# Patient Record
Sex: FEMALE | Race: WHITE | NOT HISPANIC OR LATINO | Employment: OTHER | URBAN - METROPOLITAN AREA
[De-identification: names, ages, dates, MRNs, and addresses within clinical notes are randomized per-mention and may not be internally consistent; named-entity substitution may affect disease eponyms.]

---

## 2019-05-12 ENCOUNTER — APPOINTMENT (OUTPATIENT)
Dept: GENERAL RADIOLOGY | Facility: HOSPITAL | Age: 59
End: 2019-05-12

## 2019-05-12 ENCOUNTER — HOSPITAL ENCOUNTER (INPATIENT)
Facility: HOSPITAL | Age: 59
LOS: 4 days | Discharge: HOME | End: 2019-05-16
Attending: EMERGENCY MEDICINE | Admitting: INTERNAL MEDICINE

## 2019-05-12 DIAGNOSIS — J44.1 COPD WITH ACUTE EXACERBATION (HCC): ICD-10-CM

## 2019-05-12 DIAGNOSIS — B34.8 RHINOVIRUS: ICD-10-CM

## 2019-05-12 DIAGNOSIS — J96.02 ACUTE RESPIRATORY FAILURE WITH HYPOXIA AND HYPERCAPNIA (HCC): ICD-10-CM

## 2019-05-12 DIAGNOSIS — R09.02 HYPOXIA: ICD-10-CM

## 2019-05-12 DIAGNOSIS — J96.22 ACUTE ON CHRONIC RESPIRATORY FAILURE WITH HYPOXIA AND HYPERCAPNIA (HCC): ICD-10-CM

## 2019-05-12 DIAGNOSIS — E87.1 HYPONATREMIA: ICD-10-CM

## 2019-05-12 DIAGNOSIS — J44.9 CHRONIC OBSTRUCTIVE PULMONARY DISEASE (COPD) (HCC): ICD-10-CM

## 2019-05-12 DIAGNOSIS — E11.65 UNCONTROLLED TYPE 2 DIABETES MELLITUS WITH HYPERGLYCEMIA (HCC): ICD-10-CM

## 2019-05-12 DIAGNOSIS — J96.01 ACUTE RESPIRATORY FAILURE WITH HYPOXIA AND HYPERCAPNIA (HCC): ICD-10-CM

## 2019-05-12 DIAGNOSIS — I50.9 ACUTE CONGESTIVE HEART FAILURE, UNSPECIFIED HEART FAILURE TYPE (HCC): Primary | ICD-10-CM

## 2019-05-12 DIAGNOSIS — J96.20 ACUTE AND CHRONIC RESPIRATORY FAILURE (ACUTE-ON-CHRONIC) (HCC): ICD-10-CM

## 2019-05-12 DIAGNOSIS — J96.21 ACUTE ON CHRONIC RESPIRATORY FAILURE WITH HYPOXIA AND HYPERCAPNIA (HCC): ICD-10-CM

## 2019-05-12 PROBLEM — Z91.199 NONCOMPLIANCE: Status: ACTIVE | Noted: 2019-05-12

## 2019-05-12 PROBLEM — R00.0 TACHYCARDIA: Status: ACTIVE | Noted: 2019-05-12

## 2019-05-12 LAB
ALBUMIN SERPL-MCNC: 3.3 G/DL (ref 3.5–5.2)
ALBUMIN/GLOB SERPL: 0.7 G/DL
ALP SERPL-CCNC: 111 U/L (ref 39–117)
ALT SERPL W P-5'-P-CCNC: 16 U/L (ref 1–33)
ANION GAP SERPL CALCULATED.3IONS-SCNC: 11.1 MMOL/L
ARTERIAL PATENCY WRIST A: POSITIVE
AST SERPL-CCNC: 13 U/L (ref 1–32)
ATMOSPHERIC PRESS: 746.7 MMHG
B PARAPERT DNA SPEC QL NAA+PROBE: NOT DETECTED
B PERT DNA SPEC QL NAA+PROBE: NOT DETECTED
BASE EXCESS BLDA CALC-SCNC: 6.5 MMOL/L (ref 0–2)
BASOPHILS # BLD AUTO: 0.04 10*3/MM3 (ref 0–0.2)
BASOPHILS NFR BLD AUTO: 0.3 % (ref 0–1.5)
BDY SITE: ABNORMAL
BILIRUB SERPL-MCNC: 0.4 MG/DL (ref 0.2–1.2)
BUN BLD-MCNC: 16 MG/DL (ref 8–23)
BUN/CREAT SERPL: 28.6 (ref 7–25)
C PNEUM DNA NPH QL NAA+NON-PROBE: NOT DETECTED
CALCIUM SPEC-SCNC: 9.4 MG/DL (ref 8.6–10.5)
CHLORIDE SERPL-SCNC: 88 MMOL/L (ref 98–107)
CO2 SERPL-SCNC: 29.9 MMOL/L (ref 22–29)
CREAT BLD-MCNC: 0.56 MG/DL (ref 0.57–1)
D-LACTATE SERPL-SCNC: 1.6 MMOL/L (ref 0.5–2)
DEPRECATED RDW RBC AUTO: 45.3 FL (ref 37–54)
EOSINOPHIL # BLD AUTO: 0 10*3/MM3 (ref 0–0.4)
EOSINOPHIL NFR BLD AUTO: 0 % (ref 0.3–6.2)
ERYTHROCYTE [DISTWIDTH] IN BLOOD BY AUTOMATED COUNT: 13.4 % (ref 12.3–15.4)
FLUAV H1 2009 PAND RNA NPH QL NAA+PROBE: NOT DETECTED
FLUAV H1 HA GENE NPH QL NAA+PROBE: NOT DETECTED
FLUAV H3 RNA NPH QL NAA+PROBE: NOT DETECTED
FLUAV SUBTYP SPEC NAA+PROBE: NOT DETECTED
FLUBV RNA ISLT QL NAA+PROBE: NOT DETECTED
GAS FLOW AIRWAY: 6 LPM
GFR SERPL CREATININE-BSD FRML MDRD: 110 ML/MIN/1.73
GLOBULIN UR ELPH-MCNC: 4.7 GM/DL
GLUCOSE BLD-MCNC: 385 MG/DL (ref 65–99)
GLUCOSE BLDC GLUCOMTR-MCNC: 353 MG/DL (ref 70–130)
GLUCOSE BLDC GLUCOMTR-MCNC: 400 MG/DL (ref 70–130)
GLUCOSE BLDC GLUCOMTR-MCNC: 488 MG/DL (ref 70–130)
GLUCOSE BLDC GLUCOMTR-MCNC: 520 MG/DL (ref 70–130)
HADV DNA SPEC NAA+PROBE: NOT DETECTED
HCO3 BLDA-SCNC: 37.8 MMOL/L (ref 22–28)
HCOV 229E RNA SPEC QL NAA+PROBE: NOT DETECTED
HCOV HKU1 RNA SPEC QL NAA+PROBE: NOT DETECTED
HCOV NL63 RNA SPEC QL NAA+PROBE: NOT DETECTED
HCOV OC43 RNA SPEC QL NAA+PROBE: NOT DETECTED
HCT VFR BLD AUTO: 52.8 % (ref 34–46.6)
HGB BLD-MCNC: 16 G/DL (ref 12–15.9)
HMPV RNA NPH QL NAA+NON-PROBE: NOT DETECTED
HOLD SPECIMEN: NORMAL
HOLD SPECIMEN: NORMAL
HPIV1 RNA SPEC QL NAA+PROBE: NOT DETECTED
HPIV2 RNA SPEC QL NAA+PROBE: NOT DETECTED
HPIV3 RNA NPH QL NAA+PROBE: NOT DETECTED
HPIV4 P GENE NPH QL NAA+PROBE: NOT DETECTED
IMM GRANULOCYTES # BLD AUTO: 0.06 10*3/MM3 (ref 0–0.05)
IMM GRANULOCYTES NFR BLD AUTO: 0.5 % (ref 0–0.5)
LYMPHOCYTES # BLD AUTO: 1.4 10*3/MM3 (ref 0.7–3.1)
LYMPHOCYTES NFR BLD AUTO: 12.2 % (ref 19.6–45.3)
M PNEUMO IGG SER IA-ACNC: NOT DETECTED
MCH RBC QN AUTO: 27.9 PG (ref 26.6–33)
MCHC RBC AUTO-ENTMCNC: 30.3 G/DL (ref 31.5–35.7)
MCV RBC AUTO: 92.1 FL (ref 79–97)
MODALITY: ABNORMAL
MONOCYTES # BLD AUTO: 0.76 10*3/MM3 (ref 0.1–0.9)
MONOCYTES NFR BLD AUTO: 6.6 % (ref 5–12)
NEUTROPHILS # BLD AUTO: 9.25 10*3/MM3 (ref 1.7–7)
NEUTROPHILS NFR BLD AUTO: 80.4 % (ref 42.7–76)
NRBC BLD AUTO-RTO: 0 /100 WBC (ref 0–0.2)
NT-PROBNP SERPL-MCNC: 2158 PG/ML (ref 5–900)
PCO2 BLDA: 80 MM HG (ref 35–45)
PH BLDA: 7.28 PH UNITS (ref 7.35–7.45)
PLATELET # BLD AUTO: 255 10*3/MM3 (ref 140–450)
PMV BLD AUTO: 10.6 FL (ref 6–12)
PO2 BLDA: 81.8 MM HG (ref 80–100)
POTASSIUM BLD-SCNC: 4 MMOL/L (ref 3.5–5.2)
PROCALCITONIN SERPL-MCNC: 0.14 NG/ML (ref 0.1–0.25)
PROT SERPL-MCNC: 8 G/DL (ref 6–8.5)
RBC # BLD AUTO: 5.73 10*6/MM3 (ref 3.77–5.28)
RHINOVIRUS RNA SPEC NAA+PROBE: DETECTED
RSV RNA NPH QL NAA+NON-PROBE: NOT DETECTED
SAO2 % BLDCOA: 93.6 % (ref 92–99)
SODIUM BLD-SCNC: 129 MMOL/L (ref 136–145)
TOTAL RATE: 28 BREATHS/MINUTE
TROPONIN T SERPL-MCNC: <0.01 NG/ML (ref 0–0.03)
WBC NRBC COR # BLD: 11.51 10*3/MM3 (ref 3.4–10.8)
WHOLE BLOOD HOLD SPECIMEN: NORMAL
WHOLE BLOOD HOLD SPECIMEN: NORMAL

## 2019-05-12 PROCEDURE — 71046 X-RAY EXAM CHEST 2 VIEWS: CPT

## 2019-05-12 PROCEDURE — 94799 UNLISTED PULMONARY SVC/PX: CPT

## 2019-05-12 PROCEDURE — 36600 WITHDRAWAL OF ARTERIAL BLOOD: CPT

## 2019-05-12 PROCEDURE — 87633 RESP VIRUS 12-25 TARGETS: CPT | Performed by: PHYSICIAN ASSISTANT

## 2019-05-12 PROCEDURE — 93005 ELECTROCARDIOGRAM TRACING: CPT | Performed by: INTERNAL MEDICINE

## 2019-05-12 PROCEDURE — 93010 ELECTROCARDIOGRAM REPORT: CPT | Performed by: INTERNAL MEDICINE

## 2019-05-12 PROCEDURE — 94640 AIRWAY INHALATION TREATMENT: CPT

## 2019-05-12 PROCEDURE — 25010000002 METHYLPREDNISOLONE PER 125 MG: Performed by: INTERNAL MEDICINE

## 2019-05-12 PROCEDURE — 25010000002 METHYLPREDNISOLONE PER 40 MG: Performed by: INTERNAL MEDICINE

## 2019-05-12 PROCEDURE — 93005 ELECTROCARDIOGRAM TRACING: CPT | Performed by: PHYSICIAN ASSISTANT

## 2019-05-12 PROCEDURE — 25010000002 ENOXAPARIN PER 10 MG: Performed by: INTERNAL MEDICINE

## 2019-05-12 PROCEDURE — 25010000002 FUROSEMIDE PER 20 MG: Performed by: PHYSICIAN ASSISTANT

## 2019-05-12 PROCEDURE — 63710000001 INSULIN LISPRO (HUMAN) PER 5 UNITS: Performed by: INTERNAL MEDICINE

## 2019-05-12 PROCEDURE — 82803 BLOOD GASES ANY COMBINATION: CPT

## 2019-05-12 PROCEDURE — 84145 PROCALCITONIN (PCT): CPT | Performed by: INTERNAL MEDICINE

## 2019-05-12 PROCEDURE — 85025 COMPLETE CBC W/AUTO DIFF WBC: CPT | Performed by: PHYSICIAN ASSISTANT

## 2019-05-12 PROCEDURE — 25010000002 FUROSEMIDE PER 20 MG: Performed by: INTERNAL MEDICINE

## 2019-05-12 PROCEDURE — 83880 ASSAY OF NATRIURETIC PEPTIDE: CPT | Performed by: PHYSICIAN ASSISTANT

## 2019-05-12 PROCEDURE — 99285 EMERGENCY DEPT VISIT HI MDM: CPT

## 2019-05-12 PROCEDURE — 87581 M.PNEUMON DNA AMP PROBE: CPT | Performed by: PHYSICIAN ASSISTANT

## 2019-05-12 PROCEDURE — 87798 DETECT AGENT NOS DNA AMP: CPT | Performed by: PHYSICIAN ASSISTANT

## 2019-05-12 PROCEDURE — 63710000001 INSULIN REGULAR HUMAN PER 5 UNITS: Performed by: PHYSICIAN ASSISTANT

## 2019-05-12 PROCEDURE — 87486 CHLMYD PNEUM DNA AMP PROBE: CPT | Performed by: PHYSICIAN ASSISTANT

## 2019-05-12 PROCEDURE — 80053 COMPREHEN METABOLIC PANEL: CPT | Performed by: PHYSICIAN ASSISTANT

## 2019-05-12 PROCEDURE — 84484 ASSAY OF TROPONIN QUANT: CPT | Performed by: PHYSICIAN ASSISTANT

## 2019-05-12 PROCEDURE — 93005 ELECTROCARDIOGRAM TRACING: CPT | Performed by: EMERGENCY MEDICINE

## 2019-05-12 PROCEDURE — 87040 BLOOD CULTURE FOR BACTERIA: CPT | Performed by: PHYSICIAN ASSISTANT

## 2019-05-12 PROCEDURE — 82962 GLUCOSE BLOOD TEST: CPT

## 2019-05-12 PROCEDURE — 83605 ASSAY OF LACTIC ACID: CPT | Performed by: PHYSICIAN ASSISTANT

## 2019-05-12 PROCEDURE — 63710000001 INSULIN GLARGINE PER 5 UNITS: Performed by: INTERNAL MEDICINE

## 2019-05-12 RX ORDER — NICOTINE 21 MG/24HR
1 PATCH, TRANSDERMAL 24 HOURS TRANSDERMAL EVERY 24 HOURS
Status: CANCELLED | OUTPATIENT
Start: 2019-05-12

## 2019-05-12 RX ORDER — SODIUM CHLORIDE FOR INHALATION 7 %
4 VIAL, NEBULIZER (ML) INHALATION
Status: DISCONTINUED | OUTPATIENT
Start: 2019-05-12 | End: 2019-05-16 | Stop reason: HOSPADM

## 2019-05-12 RX ORDER — ACETAMINOPHEN 325 MG/1
650 TABLET ORAL EVERY 4 HOURS PRN
Status: DISCONTINUED | OUTPATIENT
Start: 2019-05-12 | End: 2019-05-16 | Stop reason: HOSPADM

## 2019-05-12 RX ORDER — NICOTINE POLACRILEX 4 MG
15 LOZENGE BUCCAL
Status: DISCONTINUED | OUTPATIENT
Start: 2019-05-12 | End: 2019-05-16 | Stop reason: HOSPADM

## 2019-05-12 RX ORDER — IPRATROPIUM BROMIDE AND ALBUTEROL SULFATE 2.5; .5 MG/3ML; MG/3ML
3 SOLUTION RESPIRATORY (INHALATION)
Status: DISCONTINUED | OUTPATIENT
Start: 2019-05-12 | End: 2019-05-12

## 2019-05-12 RX ORDER — METHYLPREDNISOLONE SODIUM SUCCINATE 125 MG/2ML
125 INJECTION, POWDER, LYOPHILIZED, FOR SOLUTION INTRAMUSCULAR; INTRAVENOUS EVERY 8 HOURS
Status: DISCONTINUED | OUTPATIENT
Start: 2019-05-12 | End: 2019-05-12

## 2019-05-12 RX ORDER — ONDANSETRON 2 MG/ML
4 INJECTION INTRAMUSCULAR; INTRAVENOUS EVERY 6 HOURS PRN
Status: DISCONTINUED | OUTPATIENT
Start: 2019-05-12 | End: 2019-05-16 | Stop reason: HOSPADM

## 2019-05-12 RX ORDER — SODIUM CHLORIDE 0.9 % (FLUSH) 0.9 %
3-10 SYRINGE (ML) INJECTION AS NEEDED
Status: CANCELLED | OUTPATIENT
Start: 2019-05-12

## 2019-05-12 RX ORDER — SENNA AND DOCUSATE SODIUM 50; 8.6 MG/1; MG/1
2 TABLET, FILM COATED ORAL 2 TIMES DAILY PRN
Status: DISCONTINUED | OUTPATIENT
Start: 2019-05-12 | End: 2019-05-16 | Stop reason: HOSPADM

## 2019-05-12 RX ORDER — SODIUM CHLORIDE 0.9 % (FLUSH) 0.9 %
10 SYRINGE (ML) INJECTION AS NEEDED
Status: DISCONTINUED | OUTPATIENT
Start: 2019-05-12 | End: 2019-05-16 | Stop reason: HOSPADM

## 2019-05-12 RX ORDER — ONDANSETRON 2 MG/ML
4 INJECTION INTRAMUSCULAR; INTRAVENOUS EVERY 6 HOURS PRN
Status: CANCELLED | OUTPATIENT
Start: 2019-05-12

## 2019-05-12 RX ORDER — HYDROCODONE BITARTRATE AND ACETAMINOPHEN 5; 325 MG/1; MG/1
1 TABLET ORAL EVERY 4 HOURS PRN
Status: DISCONTINUED | OUTPATIENT
Start: 2019-05-12 | End: 2019-05-16 | Stop reason: HOSPADM

## 2019-05-12 RX ORDER — ONDANSETRON 4 MG/1
4 TABLET, FILM COATED ORAL EVERY 6 HOURS PRN
Status: DISCONTINUED | OUTPATIENT
Start: 2019-05-12 | End: 2019-05-16 | Stop reason: HOSPADM

## 2019-05-12 RX ORDER — SODIUM CHLORIDE 0.9 % (FLUSH) 0.9 %
3 SYRINGE (ML) INJECTION EVERY 12 HOURS SCHEDULED
Status: CANCELLED | OUTPATIENT
Start: 2019-05-12

## 2019-05-12 RX ORDER — BUDESONIDE AND FORMOTEROL FUMARATE DIHYDRATE 160; 4.5 UG/1; UG/1
2 AEROSOL RESPIRATORY (INHALATION)
Status: DISCONTINUED | OUTPATIENT
Start: 2019-05-12 | End: 2019-05-12

## 2019-05-12 RX ORDER — FUROSEMIDE 10 MG/ML
80 INJECTION INTRAMUSCULAR; INTRAVENOUS ONCE
Status: COMPLETED | OUTPATIENT
Start: 2019-05-12 | End: 2019-05-12

## 2019-05-12 RX ORDER — IPRATROPIUM BROMIDE AND ALBUTEROL SULFATE 2.5; .5 MG/3ML; MG/3ML
3 SOLUTION RESPIRATORY (INHALATION) ONCE
Status: COMPLETED | OUTPATIENT
Start: 2019-05-12 | End: 2019-05-12

## 2019-05-12 RX ORDER — METHYLPREDNISOLONE SODIUM SUCCINATE 125 MG/2ML
80 INJECTION, POWDER, LYOPHILIZED, FOR SOLUTION INTRAMUSCULAR; INTRAVENOUS EVERY 12 HOURS
Status: DISCONTINUED | OUTPATIENT
Start: 2019-05-12 | End: 2019-05-12

## 2019-05-12 RX ORDER — DEXTROSE MONOHYDRATE 25 G/50ML
25 INJECTION, SOLUTION INTRAVENOUS
Status: CANCELLED | OUTPATIENT
Start: 2019-05-12

## 2019-05-12 RX ORDER — ALBUTEROL SULFATE 2.5 MG/3ML
2.5 SOLUTION RESPIRATORY (INHALATION) EVERY 6 HOURS PRN
Status: DISCONTINUED | OUTPATIENT
Start: 2019-05-12 | End: 2019-05-16 | Stop reason: HOSPADM

## 2019-05-12 RX ORDER — DEXTROSE MONOHYDRATE 25 G/50ML
25 INJECTION, SOLUTION INTRAVENOUS
Status: DISCONTINUED | OUTPATIENT
Start: 2019-05-12 | End: 2019-05-16 | Stop reason: HOSPADM

## 2019-05-12 RX ORDER — METHYLPREDNISOLONE SODIUM SUCCINATE 40 MG/ML
40 INJECTION, POWDER, LYOPHILIZED, FOR SOLUTION INTRAMUSCULAR; INTRAVENOUS EVERY 8 HOURS
Status: DISCONTINUED | OUTPATIENT
Start: 2019-05-12 | End: 2019-05-14

## 2019-05-12 RX ORDER — ONDANSETRON 4 MG/1
4 TABLET, FILM COATED ORAL EVERY 6 HOURS PRN
Status: CANCELLED | OUTPATIENT
Start: 2019-05-12

## 2019-05-12 RX ORDER — NITROGLYCERIN 0.4 MG/1
0.4 TABLET SUBLINGUAL
Status: DISCONTINUED | OUTPATIENT
Start: 2019-05-12 | End: 2019-05-16 | Stop reason: HOSPADM

## 2019-05-12 RX ORDER — NICOTINE POLACRILEX 4 MG
15 LOZENGE BUCCAL
Status: CANCELLED | OUTPATIENT
Start: 2019-05-12

## 2019-05-12 RX ORDER — SODIUM CHLORIDE 0.9 % (FLUSH) 0.9 %
3-10 SYRINGE (ML) INJECTION AS NEEDED
Status: DISCONTINUED | OUTPATIENT
Start: 2019-05-12 | End: 2019-05-16 | Stop reason: HOSPADM

## 2019-05-12 RX ORDER — SODIUM CHLORIDE 0.9 % (FLUSH) 0.9 %
3 SYRINGE (ML) INJECTION EVERY 12 HOURS SCHEDULED
Status: DISCONTINUED | OUTPATIENT
Start: 2019-05-12 | End: 2019-05-16 | Stop reason: HOSPADM

## 2019-05-12 RX ORDER — INSULIN GLARGINE 100 [IU]/ML
20 INJECTION, SOLUTION SUBCUTANEOUS NIGHTLY
Status: DISCONTINUED | OUTPATIENT
Start: 2019-05-12 | End: 2019-05-14

## 2019-05-12 RX ORDER — ACETAMINOPHEN 325 MG/1
650 TABLET ORAL EVERY 4 HOURS PRN
Status: CANCELLED | OUTPATIENT
Start: 2019-05-12

## 2019-05-12 RX ORDER — FUROSEMIDE 10 MG/ML
40 INJECTION INTRAMUSCULAR; INTRAVENOUS
Status: DISCONTINUED | OUTPATIENT
Start: 2019-05-12 | End: 2019-05-14

## 2019-05-12 RX ORDER — NITROGLYCERIN 0.4 MG/1
0.4 TABLET SUBLINGUAL
Status: CANCELLED | OUTPATIENT
Start: 2019-05-12

## 2019-05-12 RX ORDER — IPRATROPIUM BROMIDE AND ALBUTEROL SULFATE 2.5; .5 MG/3ML; MG/3ML
3 SOLUTION RESPIRATORY (INHALATION)
Status: DISCONTINUED | OUTPATIENT
Start: 2019-05-12 | End: 2019-05-16 | Stop reason: HOSPADM

## 2019-05-12 RX ADMIN — INSULIN LISPRO 14 UNITS: 100 INJECTION, SOLUTION INTRAVENOUS; SUBCUTANEOUS at 18:51

## 2019-05-12 RX ADMIN — INSULIN LISPRO 14 UNITS: 100 INJECTION, SOLUTION INTRAVENOUS; SUBCUTANEOUS at 21:01

## 2019-05-12 RX ADMIN — FUROSEMIDE 40 MG: 10 INJECTION, SOLUTION INTRAMUSCULAR; INTRAVENOUS at 18:51

## 2019-05-12 RX ADMIN — IPRATROPIUM BROMIDE AND ALBUTEROL SULFATE 3 ML: 2.5; .5 SOLUTION RESPIRATORY (INHALATION) at 07:54

## 2019-05-12 RX ADMIN — IPRATROPIUM BROMIDE AND ALBUTEROL SULFATE 3 ML: 2.5; .5 SOLUTION RESPIRATORY (INHALATION) at 23:31

## 2019-05-12 RX ADMIN — METHYLPREDNISOLONE SODIUM SUCCINATE 40 MG: 40 INJECTION, POWDER, FOR SOLUTION INTRAMUSCULAR; INTRAVENOUS at 23:48

## 2019-05-12 RX ADMIN — SODIUM CHLORIDE, PRESERVATIVE FREE 3 ML: 5 INJECTION INTRAVENOUS at 23:49

## 2019-05-12 RX ADMIN — INSULIN GLARGINE 20 UNITS: 100 INJECTION, SOLUTION SUBCUTANEOUS at 21:01

## 2019-05-12 RX ADMIN — FUROSEMIDE 80 MG: 10 INJECTION, SOLUTION INTRAMUSCULAR; INTRAVENOUS at 09:17

## 2019-05-12 RX ADMIN — SODIUM CHLORIDE SOLN NEBU 7% 4 ML: 7 NEBU SOLN at 23:31

## 2019-05-12 RX ADMIN — ENOXAPARIN SODIUM 40 MG: 40 INJECTION SUBCUTANEOUS at 16:07

## 2019-05-12 RX ADMIN — IPRATROPIUM BROMIDE AND ALBUTEROL SULFATE 3 ML: 2.5; .5 SOLUTION RESPIRATORY (INHALATION) at 14:40

## 2019-05-12 RX ADMIN — INSULIN HUMAN 5 UNITS: 100 INJECTION, SOLUTION PARENTERAL at 10:56

## 2019-05-12 RX ADMIN — METHYLPREDNISOLONE SODIUM SUCCINATE 125 MG: 125 INJECTION, POWDER, FOR SOLUTION INTRAMUSCULAR; INTRAVENOUS at 16:07

## 2019-05-12 RX ADMIN — ALBUTEROL SULFATE 2.5 MG: 2.5 SOLUTION RESPIRATORY (INHALATION) at 15:20

## 2019-05-12 RX ADMIN — SODIUM CHLORIDE, PRESERVATIVE FREE 3 ML: 5 INJECTION INTRAVENOUS at 16:10

## 2019-05-12 RX ADMIN — IPRATROPIUM BROMIDE AND ALBUTEROL SULFATE 3 ML: 2.5; .5 SOLUTION RESPIRATORY (INHALATION) at 20:15

## 2019-05-12 NOTE — ED PROVIDER NOTES
Pt is a 59 y.o. female who presents to the ED complaining of SOA and productive cough with yellow/green sputum that started 2 days ago. She states her SOA is worse with exertion. Pt also c/o increased wheezing. She denies leg pain/swelling, fever or chills. Pt notes she has a hx of COPD, but does not have supplemental oxygen or breathing treatments at home. She states she has had 3-4 breathing treatments today.       On exam,  Constitutional: Mild distress  HENT: oropharyngeal erythema  Neck: Supple with lymphadenopathy  Cardiovascular: Heart is tachycardic, no murmur  Pulmonary: mild respiratory distress, tachypneic, speaking in short sentences, diffuse rhonchi and wheezes  Abdomen: soft, non-tender  Musculoskeletal: no lower extremity edema    Labs and imaging reviewed.     EKG          EKG time: 0735  Rhythm/Rate: sinus tachycardia, 124  P waves and OR: normal  QRS, axis: Q waves anterolaterally   ST and T waves: normal     Interpreted Contemporaneously by me, independently viewed  No prior records for comparison    Plan: Admission      MD ATTESTATION NOTE    The HARISH and I have discussed this patient's history, physical exam, and treatment plan.  I have reviewed the documentation and personally had a face to face interaction with the patient. I affirm the documentation and agree with the treatment and plan.  The attached note describes my personal findings.      Documentation assistance provided by harpal Mcfarland for Dr. Mancilla. Information recorded by the scribe was done at my direction and has been verified and validated by me.             Jose Mcfarland  05/12/19 6968       Kirit Mancilla MD  05/12/19 8123

## 2019-05-12 NOTE — H&P
Patient Name:  Emi Voss  YOB: 1960  MRN:  [id number]  Admit Date:  5/12/2019  Patient Care Team:  Provider, No Known as PCP - General      Subjective   History Present Illness     Chief Complaint   Patient presents with   • Shortness of Breath     Pt complains of SOA for the last few days with a cough, N/V/D, was 87% on RA. Hx of COPD.       Ms. Voss is a 59 y.o. current 1 PPD smoker with a history of COPD, diabetes mellitus and tobacco abuse who presents to [facility] complaining of dyspnea.  Onset was 2 days ago and has progressively gotten worse.  She described her breathing as a tightness but denies chest pain.  Dyspnea is worse on exertion.  She reports a productive cough producing green-yellow sputum and wheezing.  Denies fevers and chills.  Upon EMS arrival her O2 saturations were 84% on room air.  She is currently requiring oxygen at 10 L high flow.  She denies chest pain, palpitations, dizziness and edema.  She has not taken any insulin in quite some time and was previously prescribed 30 units of Levemir and 10 units of NovoLog 3 times a day.    Review of Systems   Constitutional: Negative for chills and fever.   HENT: Negative for congestion and dental problem.    Eyes: Negative for discharge and itching.   Respiratory: Positive for cough, chest tightness, shortness of breath and wheezing.    Cardiovascular: Negative for chest pain, palpitations and leg swelling.   Gastrointestinal: Negative for abdominal distention, abdominal pain and nausea.   Endocrine: Negative for cold intolerance and heat intolerance.   Genitourinary: Negative for difficulty urinating and dysuria.   Musculoskeletal: Negative for back pain and gait problem.   Skin: Negative for color change and pallor.   Allergic/Immunologic: Negative for environmental allergies and food allergies.   Neurological: Negative for dizziness and headaches.   Hematological: Negative for adenopathy. Does not  bruise/bleed easily.   Psychiatric/Behavioral: Negative for agitation and behavioral problems.        Personal History     Past Medical History:   Diagnosis Date   • Acute respiratory failure with hypoxia (CMS/[facility]) 2019   • COPD (chronic obstructive pulmonary disease) (CMS/[facility])    • Diabetes mellitus (CMS/[facility])    • Hyponatremia 2019   • Noncompliance 2019   • Rhinovirus infection 2019   • Tachycardia 2019   • Type 2 diabetes mellitus with hyperglycemia (CMS/[facility]) 2019     Past Surgical History:   Procedure Laterality Date   •  SECTION     • HYSTERECTOMY       History reviewed. No pertinent family history.  Social History     Tobacco Use   • Smoking status: Current Every Day Smoker     Packs/day: 1.00     Types: Cigarettes   Substance Use Topics   • Alcohol use: No     Frequency: Never   • Drug use: No     No medications prior to admission.     Allergies:  No Known Allergies    Objective    Objective     Vital Signs  Temp:  [97.3 °F (36.3 °C)-97.9 °F (36.6 °C)] 97.9 °F (36.6 °C)  Heart Rate:  [110-134] 110  Resp:  [24] 24  BP: ()/() 136/72  SpO2:  [87 %-97 %] 95 %  on  Flow (L/min):  [4-10] 10;   Device (Oxygen Therapy): high-flow nasal cannula  Body mass index is 33.66 kg/m².    Physical Exam   Constitutional: She is oriented to person, place, and time. She appears well-developed and well-nourished. No distress.   appears older than stated age   HENT:   Head: Normocephalic and atraumatic.   Eyes: Conjunctivae and EOM are normal.   Neck: Normal range of motion. Neck supple.   Cardiovascular: Normal rate.   Pulmonary/Chest: Effort normal. No respiratory distress. She has decreased breath sounds. She has wheezes. She has rales.   Abdominal: Soft. Bowel sounds are normal. She exhibits no distension. There is no tenderness.   Musculoskeletal: Normal range of motion. She exhibits edema (trace R>L).   Neurological: She is alert and oriented to person, place, and time.    Skin: Skin is warm and dry.   Psychiatric: She has a normal mood and affect. Her behavior is normal.   Nursing note and vitals reviewed.      Results Review:  I reviewed the patient's new clinical results.  I reviewed the patient's new imaging results and agree with the interpretation.  I reviewed the patient's other test results and agree with the interpretation  I personally viewed and interpreted the patient's EKG/Telemetry data  Discussed with ED provider.    Lab Results (last 24 hours)     Procedure Component Value Units Date/Time    CBC & Differential [[id number]] Collected:  05/12/19 0801    Specimen:  Blood Updated:  05/12/19 0833    Narrative:       The following orders were created for panel order CBC & Differential.  Procedure                               Abnormality         Status                     ---------                               -----------         ------                     CBC Auto Differential[[id number]]        Abnormal            Final result                 Please view results for these tests on the individual orders.    Comprehensive Metabolic Panel [[id number]]  (Abnormal) Collected:  05/12/19 0801    Specimen:  Blood Updated:  05/12/19 0842     Glucose 385 mg/dL      BUN 16 mg/dL      Creatinine 0.56 mg/dL      Sodium 129 mmol/L      Potassium 4.0 mmol/L      Chloride 88 mmol/L      CO2 29.9 mmol/L      Calcium 9.4 mg/dL      Total Protein 8.0 g/dL      Albumin 3.30 g/dL      ALT (SGPT) 16 U/L      AST (SGOT) 13 U/L      Alkaline Phosphatase 111 U/L      Total Bilirubin 0.4 mg/dL      eGFR Non African Amer 110 mL/min/1.73      Globulin 4.7 gm/dL      A/G Ratio 0.7 g/dL      BUN/Creatinine Ratio 28.6     Anion Gap 11.1 mmol/L     Narrative:       GFR Normal >60  Chronic Kidney Disease <60  Kidney Failure <15    BNP [[id number]]  (Abnormal) Collected:  05/12/19 0801    Specimen:  Blood Updated:  05/12/19 0845     proBNP 2,158.0 pg/mL     Narrative:       Among patients with dyspnea,  NT-proBNP is highly sensitive for the detection of acute congestive heart failure. In addition NT-proBNP of <300 pg/ml effectively rules out acute congestive heart failure with 99% negative predictive value.    Troponin [[id number]]  (Normal) Collected:  05/12/19 0801    Specimen:  Blood Updated:  05/12/19 0845     Troponin T <0.010 ng/mL     Narrative:       Troponin T Reference Range:  <= 0.03 ng/mL-   Negative for AMI  >0.03 ng/mL-     Abnormal for myocardial necrosis.  Clinicians would have to utilize clinical acumen, EKG, Troponin and serial changes to determine if it is an Acute Myocardial Infarction or myocardial injury due to an underlying chronic condition.     Lactic Acid, Plasma [[id number]]  (Normal) Collected:  05/12/19 0801    Specimen:  Blood Updated:  05/12/19 0828     Lactate 1.6 mmol/L     Blood Culture - Blood, Arm, Left [[id number]] Collected:  05/12/19 0801    Specimen:  Blood from Arm, Left Updated:  05/12/19 0809    CBC Auto Differential [[id number]]  (Abnormal) Collected:  05/12/19 0801    Specimen:  Blood Updated:  05/12/19 0833     WBC 11.51 10*3/mm3      RBC 5.73 10*6/mm3      Hemoglobin 16.0 g/dL      Hematocrit 52.8 %      MCV 92.1 fL      MCH 27.9 pg      MCHC 30.3 g/dL      RDW 13.4 %      RDW-SD 45.3 fl      MPV 10.6 fL      Platelets 255 10*3/mm3      Neutrophil % 80.4 %      Lymphocyte % 12.2 %      Monocyte % 6.6 %      Eosinophil % 0.0 %      Basophil % 0.3 %      Immature Grans % 0.5 %      Neutrophils, Absolute 9.25 10*3/mm3      Lymphocytes, Absolute 1.40 10*3/mm3      Monocytes, Absolute 0.76 10*3/mm3      Eosinophils, Absolute 0.00 10*3/mm3      Basophils, Absolute 0.04 10*3/mm3      Immature Grans, Absolute 0.06 10*3/mm3      nRBC 0.0 /100 WBC     Blood Culture - Blood, Arm, Left [[id number]] Collected:  05/12/19 0858    Specimen:  Blood from Arm, Left Updated:  05/12/19 0904    Respiratory Panel, PCR - Swab, Nasopharynx [[id number]]  (Abnormal) Collected:  05/12/19 0903     Specimen:  Swab from Nasopharynx Updated:  05/12/19 1023     ADENOVIRUS, PCR Not Detected     Coronavirus 229E Not Detected     Coronavirus HKU1 Not Detected     Coronavirus NL63 Not Detected     Coronavirus OC43 Not Detected     Human Metapneumovirus Not Detected     Human Rhinovirus/Enterovirus Detected     Influenza B PCR Not Detected     Parainfluenza Virus 1 Not Detected     Parainfluenza Virus 2 Not Detected     Parainfluenza Virus 3 Not Detected     Parainfluenza Virus 4 Not Detected     Bordetella pertussis pcr Not Detected     Influenza A H1 2009 PCR Not Detected     Chlamydophila pneumoniae PCR Not Detected     Mycoplasma pneumo by PCR Not Detected     Influenza A PCR Not Detected     Influenza A H3 Not Detected     Influenza A H1 Not Detected     RSV, PCR Not Detected     Bordetella parapertussis PCR Not Detected    POC Glucose Once [[id number]]  (Abnormal) Collected:  05/12/19 1136    Specimen:  Blood Updated:  05/12/19 1138     Glucose 400 mg/dL           Imaging Results (last 24 hours)     Procedure Component Value Units Date/Time    XR Chest 2 View [[id number]] Collected:  05/12/19 0840     Updated:  05/12/19 0845    Narrative:       XR CHEST 2 VW-     HISTORY: Female who is 59 years-old,  congestive heart failure     TECHNIQUE: Frontal and lateral views of the chest     COMPARISON: None available     FINDINGS: The heart is enlarged. Pulmonary vasculature is congested,  with mild edema. No focal pulmonary consolidation, pleural effusion, or  pneumothorax. No acute osseous process.       Impression:       Cardiomegaly with pulmonary vascular congestion, mild edema.  Follow-up suggested.     This report was finalized on 5/12/2019 8:42 AM by Dr. Adalberto Gannon M.D.                  ECG 12 Lead   Preliminary Result   HEART RATE= 124  bpm   RR Interval= 484  ms   HI Interval= 191  ms   P Horizontal Axis= 18  deg   P Front Axis= 81  deg   QRSD Interval= 98  ms   QT Interval= 299  ms   QRS Axis= 141  deg    T Wave Axis= -5  deg   - ABNORMAL ECG -   Sinus tachycardia   Anterolateral infarct, age indeterminate   Electronically Signed By:    Date and Time of Study: 2019-05-12 07:35:03           Assessment/Plan     Active Hospital Problems    Diagnosis POA   • **Acute congestive heart failure (CMS/[facility]) [I50.9] Yes   • Hyponatremia [E87.1] Yes   • Acute respiratory failure with hypoxia (CMS/[facility]) [J96.01] Yes   • Chronic obstructive pulmonary disease with acute exacerbation (CMS/[facility]) [J44.1] Yes   • Type 2 diabetes mellitus with hyperglycemia (CMS/[facility]) [E11.65] Yes   • Rhinovirus infection [B34.8] Yes   • Tachycardia [R00.0] Unknown   • Noncompliance [Z91.19] Not Applicable     · Acute congestive heart failure- echo and consult cardiology.  Continue to diuresis with 40 mg of IV Lasix twice daily.  Monitor renal function.  Strict I&Os and daily weights.  · Acute COPD exacerbation with acute respiratory failure and hypoxia- also positive for rhinovirus. IV Solu-Medrol 80 mg every 12 hours, DuoNeb's every 4 hours. Blood cx pending.  · DM2-will start on 20 units of Lantus qHS and initiate moderate dose correctional factor.  A1c pending.  · Hyponatremia- monitor  · I discussed the patients findings and my recommendations with patient and nursing staff.    VTE Prophylaxis - Lovenox 40 mg SC daily.  Code Status - Full code.       ROSSY Lane  [facility]  05/12/19  12:56 PM    Addendum: I have reviewed the history and plan as obtained by ROSSY De Leon. I have personally examined the patient. I have reviewed available clinical results, imaging results, EKG/Telemetry results, and prior records. My exam confirms her physical findings and I agree with the plan as listed above, with the addition of the following:    Ms. Voss has acute hypoxic respiratory failure secondary to COPD exacerbation most likely caused by both noncompliance and also an acute rhinovirus infection.  She also has  acute heart failure and symptoms of orthopnea and worsened edema.  She denies chest pain or palpitations.  She was at 84% on room air in the emergency room and is currently on 10 L.  She reports she did have some improvement after steroids breathing treatments and IV Lasix.  On exam she is speaking in shortened sentences and has decreased breath sounds bilateral rhonchi and extra Tory next Tory wheezes.  She also has 1+ bilateral lower extremity edema.  She is currently tachycardic but regular rhythm.  Plan is to continue the IV Lasix and Solu-Medrol.  Will give duo nebs and I am going to start Symbicort as well.  Albuterol as needed.  She has uncontrolled diabetes and is not on any medication as an outpatient.  She needs the steroids for bronchospasm so we will give long-acting and short acting insulin to try to bring her blood sugar down.  Consult cardiology and echo has been ordered for the heart failure.  If her respiratory status worsens we may need to consult pulmonology as well.    Harley Dumont MD  [facility]  05/12/19  1:15 PM

## 2019-05-12 NOTE — CONSULTS
[facility]  Phone: [phone]  Alex Olivarez MD      Subjective   LOS: 0 days     Thank you for this consultation.  59-year-old female with COPD who has never seen a pulmonary physician before.  She is a current smoker of at least a pack of cigarettes a day and has been smoking since she was very young.  She developed shortness of breath that was worse than usual for the last 2 to 3 days.  Reports a cough productive of phlegm.  No fever or chills.  Her oxygen saturation was low on admission requiring very high flows.  We will call because her oxygen requirements have been fluctuating in the hospital between 6 L and 10 L.  Her blood gas shows hypercapnia.  This is likely the cause.  Patient states that she does not use any oxygen at home.  She has diabetes but denies other health problems.  I do not see any inhalers in her home med list.  She denies any history of cancer of kidney disease or strokes.    Emi Voss  reports that she does not drink alcohol.,  reports that she has been smoking cigarettes.  She has been smoking about 1.00 pack per day. She does not have any smokeless tobacco history on file.     Past Hx:  has a past medical history of Acute respiratory failure with hypoxia (CMS/HCC) (2019), COPD (chronic obstructive pulmonary disease) (CMS/HCC), Diabetes mellitus (CMS/HCC), Hyponatremia (2019), Noncompliance (2019), Rhinovirus infection (2019), Tachycardia (2019), and Type 2 diabetes mellitus with hyperglycemia (CMS/HCC) (2019).  Surg Hx:  has a past surgical history that includes Hysterectomy and  section.  FH: family history is not on file.  SH:  reports that she has been smoking cigarettes.  She has been smoking about 1.00 pack per day. She does not have any smokeless tobacco history on file. She reports that she does not drink alcohol or use drugs.    No medications prior to admission.     No Known Allergies    Review of Systems    Constitutional: Negative for chills and fever.   HENT: Negative for congestion, postnasal drip and trouble swallowing.    Respiratory: Positive for cough, shortness of breath and wheezing.    Cardiovascular: Positive for chest pain. Negative for leg swelling.   Gastrointestinal: Negative for abdominal pain, diarrhea, nausea and vomiting.   Endocrine: Negative for cold intolerance and heat intolerance.   Genitourinary: Negative for frequency and urgency.   Musculoskeletal: Negative for arthralgias and back pain.   Skin: Negative for pallor and rash.   Neurological: Negative for seizures and headaches.   Psychiatric/Behavioral: Negative for confusion. The patient is not nervous/anxious.      Vital Signs past 24hrs  BP range: BP: ()/() 136/72  Pulse range: Heart Rate:  [102-134] 106  Resp rate range: Resp:  [24-28] 28  Temp range: Temp (24hrs), Av.6 °F (36.4 °C), Min:97.3 °F (36.3 °C), Max:97.9 °F (36.6 °C)    Oxygen range: SpO2:  [87 %-97 %] 95 %; Flow (L/min):  [4-10] 6;   Device (Oxygen Therapy): high-flow mask  89 kg (196 lb 1.6 oz); Body mass index is 33.66 kg/m².  No intake/output data recorded.    Adult female who is sitting up in bed.  Presently no acute distress and states she feels much better since coming to the hospital.  She is on a high flow cannula which was at 6 to 7 L when I walked in.  I cut her down to 4 L and her saturation is about 85%.  Pupils equal and react to light.  Oropharynx moist with a class III Mallampati airway no posterior pharyngeal discharge.  Nasopharynx without discharge septum midline.  JVP not elevated trachea midline thyroid not enlarged.  Lungs reveal bilateral air entry very diminished.  Very coarse bilateral moderate wheezing.  No rales.  Percussion note resonant chest expansion equal no chest wall deformities or tenderness.  Heart examination S1-S2 present rhythm regular and tachycardic.  However no irregularity on the monitors.  No obvious murmurs.  No  edema lower extremities.  Abdomen is soft nontender bowel sounds present no liver spleen enlargement.  No peripheral cyanosis or clubbing.  Moves all 4 extremities sensorimotor intact.  No cervical, axillary, inguinal adenopathy noted.    Results Review:    I have reviewed the laboratory and imaging data from current admission. My annotations are as noted in assessment and plan.    Medication Review:  I have reviewed the current MAR. My annotations are as noted in assessment and plan.    Plan   PCCM Problems  Acute and chronic respiratory failure, hypoxia and hypercapnia  COPD exacerbation  Acute rhinovirus infection  Current cigarette smoker  Pulmonary infiltrates consistent with pulmonary vascular congestion  Relevant Medical Diagnoses  Diabetes mellitus  Hyperglycemia due to steroids  Acute hyponatremia    Plan of Treatment  The reason the oxygenation is bouncing around so much as the hypercapnia.  Will order noninvasive to use at night and as needed during the day.  Explained to patient.  We will see if she tolerates.    Her oxygen flows must be kept to the minimum possible her saturation should not be beyond 90%.  I talked to the nurse and saturations of 85 to 90% is ordered.    COPD exacerbation due to acute viral infection.  Nebs adjusted.  7% saline added for help with expectoration.  Flutter valve added.  Continue IV steroids.    Needs to quit smoking.    Management of blood sugars per primary team.    Note chest x-ray and possibly consistent with fluid overload.  Note order for Lasix.  We will see how she responds.  Not having any leg edema and BNP elevation could be reflection of pulmonary hypertension due to severe lung disease.    Address hyponatremia per primary team.    This patient requires a low-dose screening CT of the chest once she has been discharged, given her long smoking history and to screen for lung cancer.    Spoke with family at bedside.    Part of this note may be an electronic  transcription/translation of spoken language to printed text using the Dragon Dictation System.

## 2019-05-12 NOTE — PROGRESS NOTES
RT Note 5/12/2019    Patient admitted to 5S from ER, and RT was called to assess patient and draw ABG.  Patient is tachypneic with a RR 28.  Coarse crackles and rhonchi scattered throughout all lung fields.  Patient states she is producing sputum with her cough.  ABG showed a pH of 7.28 and Co2 of 80.  These critical results were called to Dr. Delacruz and Dr. Olivarez.  Dr Olivarez came to bedside to assess patient and gave orders to keep oxygen sats between 85-90%.  Respiratory therapy will continue to follow.

## 2019-05-12 NOTE — PLAN OF CARE
Problem: Patient Care Overview  Goal: Plan of Care Review  Outcome: Ongoing (interventions implemented as appropriate)   05/12/19 0178   Coping/Psychosocial   Plan of Care Reviewed With patient;daughter   Plan of Care Review   Progress no change

## 2019-05-12 NOTE — ED PROVIDER NOTES
EMERGENCY DEPARTMENT ENCOUNTER    Room Number:    Date seen:  2019  Time seen: 7:27 AM  PCP: Provider, No Known    HPI:  Chief complaint: dyspnea  Context:Emi Voss is a 59 y.o. female who presents to the ED to the ED via EMS w/ c/o dyspnea X 2 days w/ associated cough and pleuritic chest pain. She denies leg swelling, cardiac CP, fever, and other complaints. Pt does not use supplemental O2 at home and admits to h/o COPD and current smoking, but denies CHF as well as any hx of heart disease. She is a smoker. Pt received 125 mg of Solumedrol, one duo-neb, and one albuterol treatment by EMS en route to the facility.    Onset: gradual  Location: n/a  Radiation: n/a  Duration: 2 days  Timing: constant  Character: shortness of air  Aggravating Factors: none  Alleviating Factors: none  Severity: moderate    ALLERGIES  Patient has no known allergies.    PAST MEDICAL HISTORY  Active Ambulatory Problems     Diagnosis Date Noted   • No Active Ambulatory Problems     Resolved Ambulatory Problems     Diagnosis Date Noted   • No Resolved Ambulatory Problems     Past Medical History:   Diagnosis Date   • COPD (chronic obstructive pulmonary disease) (CMS/Formerly [facility])    • Diabetes mellitus (CMS/Formerly [facility])        PAST SURGICAL HISTORY  Past Surgical History:   Procedure Laterality Date   •  SECTION     • HYSTERECTOMY         FAMILY HISTORY  History reviewed. No pertinent family history.    SOCIAL HISTORY  Social History     Socioeconomic History   • Marital status:      Spouse name: Not on file   • Number of children: Not on file   • Years of education: Not on file   • Highest education level: Not on file   Tobacco Use   • Smoking status: Current Every Day Smoker     Packs/day: 1.00     Types: Cigarettes   Substance and Sexual Activity   • Alcohol use: No     Frequency: Never   • Drug use: No   • Sexual activity: Defer       REVIEW OF SYSTEMS  Review of Systems   Constitutional: Negative for chills and fever.    HENT: Negative.    Eyes: Negative.    Respiratory: Positive for cough, chest tightness and shortness of breath.    Cardiovascular: Negative for chest pain.   Gastrointestinal: Negative for abdominal pain.   Genitourinary: Negative.    Musculoskeletal: Negative.    Skin: Negative.    Neurological: Negative.    Psychiatric/Behavioral: Negative.        PHYSICAL EXAM  ED Triage Vitals [05/12/19 0717]   Temp Heart Rate Resp BP SpO2   97.3 °F (36.3 °C) 118 24 91/57 97 %      Temp src Heart Rate Source Patient Position BP Location FiO2 (%)   Tympanic Monitor Lying -- --     Physical Exam   Constitutional: She is oriented to person, place, and time. She appears distressed.   HENT:   Head: Normocephalic and atraumatic.   Right Ear: External ear normal.   Left Ear: External ear normal.   Nose: Nose normal.   Mouth/Throat: Mucous membranes are normal.   Eyes: Conjunctivae are normal.   Neck: Normal range of motion.   Cardiovascular: Regular rhythm and intact distal pulses. Tachycardia present.   Pulmonary/Chest: She is in respiratory distress. She has wheezes (scattered in all lung fields). She has rhonchi (scattered in all lung fields). She has rales (scattered in all lung fields).   Abdominal: She exhibits no distension.   Musculoskeletal: Normal range of motion. She exhibits no edema.   Neurological: She is alert and oriented to person, place, and time.   Skin: Skin is warm and dry.   Psychiatric: Affect normal.   Nursing note and vitals reviewed.      LAB RESULTS  Recent Results (from the past 24 hour(s))   Comprehensive Metabolic Panel    Collection Time: 05/12/19  8:01 AM   Result Value Ref Range    Glucose 385 (H) 65 - 99 mg/dL    BUN 16 8 - 23 mg/dL    Creatinine 0.56 (L) 0.57 - 1.00 mg/dL    Sodium 129 (L) 136 - 145 mmol/L    Potassium 4.0 3.5 - 5.2 mmol/L    Chloride 88 (L) 98 - 107 mmol/L    CO2 29.9 (H) 22.0 - 29.0 mmol/L    Calcium 9.4 8.6 - 10.5 mg/dL    Total Protein 8.0 6.0 - 8.5 g/dL    Albumin 3.30 (L) 3.50  - 5.20 g/dL    ALT (SGPT) 16 1 - 33 U/L    AST (SGOT) 13 1 - 32 U/L    Alkaline Phosphatase 111 39 - 117 U/L    Total Bilirubin 0.4 0.2 - 1.2 mg/dL    eGFR Non African Amer 110 >60 mL/min/1.73    Globulin 4.7 gm/dL    A/G Ratio 0.7 g/dL    BUN/Creatinine Ratio 28.6 (H) 7.0 - 25.0    Anion Gap 11.1 mmol/L   BNP    Collection Time: 05/12/19  8:01 AM   Result Value Ref Range    proBNP 2,158.0 (H) 5.0 - 900.0 pg/mL   Troponin    Collection Time: 05/12/19  8:01 AM   Result Value Ref Range    Troponin T <0.010 0.000 - 0.030 ng/mL   Lactic Acid, Plasma    Collection Time: 05/12/19  8:01 AM   Result Value Ref Range    Lactate 1.6 0.5 - 2.0 mmol/L   Light Blue Top    Collection Time: 05/12/19  8:01 AM   Result Value Ref Range    Extra Tube hold for add-on    Green Top (Gel)    Collection Time: 05/12/19  8:01 AM   Result Value Ref Range    Extra Tube Hold for add-ons.    Lavender Top    Collection Time: 05/12/19  8:01 AM   Result Value Ref Range    Extra Tube hold for add-on    Gold Top - SST    Collection Time: 05/12/19  8:01 AM   Result Value Ref Range    Extra Tube Hold for add-ons.    CBC Auto Differential    Collection Time: 05/12/19  8:01 AM   Result Value Ref Range    WBC 11.51 (H) 3.40 - 10.80 10*3/mm3    RBC 5.73 (H) 3.77 - 5.28 10*6/mm3    Hemoglobin 16.0 (H) 12.0 - 15.9 g/dL    Hematocrit 52.8 (H) 34.0 - 46.6 %    MCV 92.1 79.0 - 97.0 fL    MCH 27.9 26.6 - 33.0 pg    MCHC 30.3 (L) 31.5 - 35.7 g/dL    RDW 13.4 12.3 - 15.4 %    RDW-SD 45.3 37.0 - 54.0 fl    MPV 10.6 6.0 - 12.0 fL    Platelets 255 140 - 450 10*3/mm3    Neutrophil % 80.4 (H) 42.7 - 76.0 %    Lymphocyte % 12.2 (L) 19.6 - 45.3 %    Monocyte % 6.6 5.0 - 12.0 %    Eosinophil % 0.0 (L) 0.3 - 6.2 %    Basophil % 0.3 0.0 - 1.5 %    Immature Grans % 0.5 0.0 - 0.5 %    Neutrophils, Absolute 9.25 (H) 1.70 - 7.00 10*3/mm3    Lymphocytes, Absolute 1.40 0.70 - 3.10 10*3/mm3    Monocytes, Absolute 0.76 0.10 - 0.90 10*3/mm3    Eosinophils, Absolute 0.00 0.00 - 0.40  "10*3/mm3    Basophils, Absolute 0.04 0.00 - 0.20 10*3/mm3    Immature Grans, Absolute 0.06 (H) 0.00 - 0.05 10*3/mm3    nRBC 0.0 0.0 - 0.2 /100 WBC       I ordered the above labs and reviewed the results    RADIOLOGY  XR Chest 2 View   Final Result   Cardiomegaly with pulmonary vascular congestion, mild edema.   Follow-up suggested.       This report was finalized on 5/12/2019 8:42 AM by Dr. Adalberto Gannon M.D.              I ordered the above noted radiological studies and reviewed the images on the PACS system.     MEDICATIONS GIVEN IN ER  Medications   sodium chloride 0.9 % flush 10 mL (not administered)   ipratropium-albuterol (DUO-NEB) nebulizer solution 3 mL (3 mL Nebulization Given 5/12/19 5804)   furosemide (LASIX) injection 80 mg (80 mg Intravenous Given 5/12/19 0917)       EKG  Interpreted by ED Physician (Dr. Mancilla)    PROCEDURES  Procedures      PROGRESS AND CONSULTS    Progress Notes:       0800  Reviewed pt's history and workup with Dr. Mancilla.  After a bedside evaluation; Dr Mancilla agrees with the plan of care    0900  BP- 119/77 HR- (!) 121 Temp- 97.3 °F (36.3 °C) (Tympanic) O2 sat- 96%  Differed sepsis bolus at this time due to CHF concerns. Rechecked the patient who is improved. Questioned pt about hx of DM. Pt admits to hx of DM but is noncomplaint w/ her medications and reconfirmed no hx of CHF. Discussed the plan to admit. Pt understands and agrees with the plan, all questions answered.    0916  Discussed the pt w/ PATY Moise. He agreed to admit.    Disposition vitals:  /77   Pulse (!) 121   Temp 97.3 °F (36.3 °C) (Tympanic)   Resp 24   Ht 162.6 cm (64\")   Wt 89 kg (196 lb 1.6 oz)   SpO2 96%   BMI 33.66 kg/m²       DIAGNOSIS  Final diagnoses:   Acute congestive heart failure, unspecified heart failure type (CMS/HCC)   Hypoxia   Hyponatremia   Uncontrolled type 2 diabetes mellitus with hyperglycemia (CMS/HCC)       Documentation assistance provided by harpal Moreau " for Kerry Gonsales PA-C.  Information recorded by the scribe was done at my direction and has been verified and validated by me.     Nancie Moreau  05/12/19 0919       Kerry Gonsales PA  05/12/19 1627

## 2019-05-12 NOTE — PLAN OF CARE
Problem: Patient Care Overview  Goal: Plan of Care Review  Outcome: Ongoing (interventions implemented as appropriate)   05/12/19 1951   Coping/Psychosocial   Plan of Care Reviewed With patient   Plan of Care Review   Progress no change   OTHER   Outcome Summary Admitted to floor from ER. Patient desaturates with any activity. Was once up to 15L per high flow nassal cannula. Currently weaned down to 4L per n/c. Orders from pulmaonary to maintain saturation between 85-90% and Dr. Olivarez stated to not turn oxygen above 6L. Will CTM.       Problem: Fall Risk (Adult)  Goal: Identify Related Risk Factors and Signs and Symptoms  Outcome: Ongoing (interventions implemented as appropriate)    Goal: Absence of Fall  Outcome: Ongoing (interventions implemented as appropriate)      Problem: Diabetes, Type 2 (Adult)  Goal: Signs and Symptoms of Listed Potential Problems Will be Absent, Minimized or Managed (Diabetes, Type 2)  Outcome: Ongoing (interventions implemented as appropriate)      Problem: Chronic Obstructive Pulmonary Disease (Adult)  Goal: Signs and Symptoms of Listed Potential Problems Will be Absent, Minimized or Managed (Chronic Obstructive Pulmonary Disease)  Outcome: Ongoing (interventions implemented as appropriate)      Problem: Cardiac: Heart Failure (Adult)  Goal: Signs and Symptoms of Listed Potential Problems Will be Absent, Minimized or Managed (Cardiac: Heart Failure)  Outcome: Ongoing (interventions implemented as appropriate)

## 2019-05-13 ENCOUNTER — APPOINTMENT (OUTPATIENT)
Dept: CARDIOLOGY | Facility: HOSPITAL | Age: 59
End: 2019-05-13

## 2019-05-13 PROBLEM — J96.02 ACUTE RESPIRATORY FAILURE WITH HYPOXIA AND HYPERCAPNIA (HCC): Status: ACTIVE | Noted: 2019-05-12

## 2019-05-13 LAB
ALBUMIN SERPL-MCNC: 2.9 G/DL (ref 3.5–5.2)
ALBUMIN/GLOB SERPL: 0.6 G/DL
ALP SERPL-CCNC: 110 U/L (ref 39–117)
ALT SERPL W P-5'-P-CCNC: 18 U/L (ref 1–33)
ANION GAP SERPL CALCULATED.3IONS-SCNC: 13.6 MMOL/L
AORTIC DIMENSIONLESS INDEX: 0.8 (DI)
AST SERPL-CCNC: 14 U/L (ref 1–32)
BASOPHILS # BLD AUTO: 0.02 10*3/MM3 (ref 0–0.2)
BASOPHILS NFR BLD AUTO: 0.2 % (ref 0–1.5)
BH CV ECHO MEAS - ACS: 1.8 CM
BH CV ECHO MEAS - AO MAX PG: 8 MMHG
BH CV ECHO MEAS - AO MEAN PG (FULL): 2 MMHG
BH CV ECHO MEAS - AO MEAN PG: 5 MMHG
BH CV ECHO MEAS - AO ROOT AREA (BSA CORRECTED): 1.4
BH CV ECHO MEAS - AO ROOT AREA: 4.9 CM^2
BH CV ECHO MEAS - AO ROOT DIAM: 2.5 CM
BH CV ECHO MEAS - AO V2 MAX: 141 CM/SEC
BH CV ECHO MEAS - AO V2 MEAN: 103 CM/SEC
BH CV ECHO MEAS - AO V2 VTI: 27.4 CM
BH CV ECHO MEAS - ASC AORTA: 2.8 CM
BH CV ECHO MEAS - AVA(I,A): 2.7 CM^2
BH CV ECHO MEAS - AVA(I,D): 2.7 CM^2
BH CV ECHO MEAS - BSA(HAYCOCK): 1.8 M^2
BH CV ECHO MEAS - BSA: 1.8 M^2
BH CV ECHO MEAS - BZI_BMI: 27.5 KILOGRAMS/M^2
BH CV ECHO MEAS - BZI_METRIC_HEIGHT: 162.6 CM
BH CV ECHO MEAS - BZI_METRIC_WEIGHT: 72.6 KG
BH CV ECHO MEAS - EDV(CUBED): 59.3 ML
BH CV ECHO MEAS - EDV(MOD-SP2): 77 ML
BH CV ECHO MEAS - EDV(MOD-SP4): 46 ML
BH CV ECHO MEAS - EDV(TEICH): 65.9 ML
BH CV ECHO MEAS - EF(CUBED): 66.8 %
BH CV ECHO MEAS - EF(MOD-BP): 72 %
BH CV ECHO MEAS - EF(MOD-SP2): 72.7 %
BH CV ECHO MEAS - EF(MOD-SP4): 67.4 %
BH CV ECHO MEAS - EF(TEICH): 59 %
BH CV ECHO MEAS - ESV(CUBED): 19.7 ML
BH CV ECHO MEAS - ESV(MOD-SP2): 21 ML
BH CV ECHO MEAS - ESV(MOD-SP4): 15 ML
BH CV ECHO MEAS - ESV(TEICH): 27 ML
BH CV ECHO MEAS - FS: 30.8 %
BH CV ECHO MEAS - IVS/LVPW: 1.2
BH CV ECHO MEAS - IVSD: 1.5 CM
BH CV ECHO MEAS - LAT PEAK E' VEL: 10.4 CM/SEC
BH CV ECHO MEAS - LV DIASTOLIC VOL/BSA (35-75): 25.9 ML/M^2
BH CV ECHO MEAS - LV MASS(C)D: 201.5 GRAMS
BH CV ECHO MEAS - LV MASS(C)DI: 113.3 GRAMS/M^2
BH CV ECHO MEAS - LV MEAN PG: 3 MMHG
BH CV ECHO MEAS - LV SYSTOLIC VOL/BSA (12-30): 8.4 ML/M^2
BH CV ECHO MEAS - LV V1 MAX: 117 CM/SEC
BH CV ECHO MEAS - LV V1 MEAN: 82.2 CM/SEC
BH CV ECHO MEAS - LV V1 VTI: 23.2 CM
BH CV ECHO MEAS - LVIDD: 3.9 CM
BH CV ECHO MEAS - LVIDS: 2.7 CM
BH CV ECHO MEAS - LVLD AP2: 7.4 CM
BH CV ECHO MEAS - LVLD AP4: 6.4 CM
BH CV ECHO MEAS - LVLS AP2: 5.5 CM
BH CV ECHO MEAS - LVLS AP4: 5.3 CM
BH CV ECHO MEAS - LVOT AREA (M): 3.1 CM^2
BH CV ECHO MEAS - LVOT AREA: 3.1 CM^2
BH CV ECHO MEAS - LVOT DIAM: 2 CM
BH CV ECHO MEAS - LVPWD: 1.3 CM
BH CV ECHO MEAS - MED PEAK E' VEL: 7.8 CM/SEC
BH CV ECHO MEAS - MV A DUR: 0.14 SEC
BH CV ECHO MEAS - MV A MAX VEL: 105 CM/SEC
BH CV ECHO MEAS - MV DEC SLOPE: 661 CM/SEC^2
BH CV ECHO MEAS - MV DEC TIME: 0.13 SEC
BH CV ECHO MEAS - MV E MAX VEL: 88.4 CM/SEC
BH CV ECHO MEAS - MV E/A: 0.84
BH CV ECHO MEAS - MV MEAN PG: 3 MMHG
BH CV ECHO MEAS - MV P1/2T MAX VEL: 95.9 CM/SEC
BH CV ECHO MEAS - MV P1/2T: 42.5 MSEC
BH CV ECHO MEAS - MV V2 MEAN: 78 CM/SEC
BH CV ECHO MEAS - MV V2 VTI: 17.5 CM
BH CV ECHO MEAS - MVA P1/2T LCG: 2.3 CM^2
BH CV ECHO MEAS - MVA(P1/2T): 5.2 CM^2
BH CV ECHO MEAS - MVA(VTI): 4.2 CM^2
BH CV ECHO MEAS - PA ACC SLOPE: 833 CM/SEC^2
BH CV ECHO MEAS - PA ACC TIME: 0.11 SEC
BH CV ECHO MEAS - PA MAX PG: 3.2 MMHG
BH CV ECHO MEAS - PA PR(ACCEL): 30 MMHG
BH CV ECHO MEAS - PA V2 MAX: 89.6 CM/SEC
BH CV ECHO MEAS - PULM A REVS DUR: 0.17 SEC
BH CV ECHO MEAS - PULM A REVS VEL: 28.6 CM/SEC
BH CV ECHO MEAS - PULM DIAS VEL: 33.3 CM/SEC
BH CV ECHO MEAS - PULM S/D: 1.2
BH CV ECHO MEAS - PULM SYS VEL: 39 CM/SEC
BH CV ECHO MEAS - QP/QS: 0.74
BH CV ECHO MEAS - RV MEAN PG: 1 MMHG
BH CV ECHO MEAS - RV V1 MEAN: 51.8 CM/SEC
BH CV ECHO MEAS - RV V1 VTI: 14.2 CM
BH CV ECHO MEAS - RVOT AREA: 3.8 CM^2
BH CV ECHO MEAS - RVOT DIAM: 2.2 CM
BH CV ECHO MEAS - SI(AO): 75.6 ML/M^2
BH CV ECHO MEAS - SI(CUBED): 22.3 ML/M^2
BH CV ECHO MEAS - SI(LVOT): 41 ML/M^2
BH CV ECHO MEAS - SI(MOD-SP2): 31.5 ML/M^2
BH CV ECHO MEAS - SI(MOD-SP4): 17.4 ML/M^2
BH CV ECHO MEAS - SI(TEICH): 21.9 ML/M^2
BH CV ECHO MEAS - SV(AO): 134.5 ML
BH CV ECHO MEAS - SV(CUBED): 39.6 ML
BH CV ECHO MEAS - SV(LVOT): 72.9 ML
BH CV ECHO MEAS - SV(MOD-SP2): 56 ML
BH CV ECHO MEAS - SV(MOD-SP4): 31 ML
BH CV ECHO MEAS - SV(RVOT): 54 ML
BH CV ECHO MEAS - SV(TEICH): 38.9 ML
BH CV ECHO MEAS - TAPSE (>1.6): 2.1 CM2
BH CV ECHO MEASUREMENTS AVERAGE E/E' RATIO: 9.71
BH CV XLRA - RV BASE: 3.4 CM
BH CV XLRA - RV LENGTH: 7.3 CM
BH CV XLRA - RV MID: 2.6 CM
BH CV XLRA - TDI S': 11.8 CM/SEC
BILIRUB SERPL-MCNC: 0.2 MG/DL (ref 0.2–1.2)
BUN BLD-MCNC: 27 MG/DL (ref 8–23)
BUN/CREAT SERPL: 35.5 (ref 7–25)
CALCIUM SPEC-SCNC: 9.3 MG/DL (ref 8.6–10.5)
CHLORIDE SERPL-SCNC: 91 MMOL/L (ref 98–107)
CO2 SERPL-SCNC: 32.4 MMOL/L (ref 22–29)
CREAT BLD-MCNC: 0.76 MG/DL (ref 0.57–1)
DEPRECATED RDW RBC AUTO: 44.9 FL (ref 37–54)
EOSINOPHIL # BLD AUTO: 0 10*3/MM3 (ref 0–0.4)
EOSINOPHIL NFR BLD AUTO: 0 % (ref 0.3–6.2)
ERYTHROCYTE [DISTWIDTH] IN BLOOD BY AUTOMATED COUNT: 13.2 % (ref 12.3–15.4)
GFR SERPL CREATININE-BSD FRML MDRD: 77 ML/MIN/1.73
GLOBULIN UR ELPH-MCNC: 4.8 GM/DL
GLUCOSE BLD-MCNC: 406 MG/DL (ref 65–99)
GLUCOSE BLDC GLUCOMTR-MCNC: 159 MG/DL (ref 70–130)
GLUCOSE BLDC GLUCOMTR-MCNC: 236 MG/DL (ref 70–130)
GLUCOSE BLDC GLUCOMTR-MCNC: 350 MG/DL (ref 70–130)
GLUCOSE BLDC GLUCOMTR-MCNC: 430 MG/DL (ref 70–130)
HBA1C MFR BLD: 12.4 % (ref 4.8–5.6)
HCT VFR BLD AUTO: 51 % (ref 34–46.6)
HGB BLD-MCNC: 15.4 G/DL (ref 12–15.9)
IMM GRANULOCYTES # BLD AUTO: 0.08 10*3/MM3 (ref 0–0.05)
IMM GRANULOCYTES NFR BLD AUTO: 0.8 % (ref 0–0.5)
LEFT ATRIUM VOLUME INDEX: 21 ML/M2
LYMPHOCYTES # BLD AUTO: 0.64 10*3/MM3 (ref 0.7–3.1)
LYMPHOCYTES NFR BLD AUTO: 6.2 % (ref 19.6–45.3)
MAXIMAL PREDICTED HEART RATE: 159 BPM
MCH RBC QN AUTO: 27.9 PG (ref 26.6–33)
MCHC RBC AUTO-ENTMCNC: 30.2 G/DL (ref 31.5–35.7)
MCV RBC AUTO: 92.6 FL (ref 79–97)
MONOCYTES # BLD AUTO: 0.42 10*3/MM3 (ref 0.1–0.9)
MONOCYTES NFR BLD AUTO: 4.1 % (ref 5–12)
NEUTROPHILS # BLD AUTO: 9.12 10*3/MM3 (ref 1.7–7)
NEUTROPHILS NFR BLD AUTO: 88.7 % (ref 42.7–76)
NRBC BLD AUTO-RTO: 0 /100 WBC (ref 0–0.2)
PLATELET # BLD AUTO: 258 10*3/MM3 (ref 140–450)
PMV BLD AUTO: 10.7 FL (ref 6–12)
POTASSIUM BLD-SCNC: 3.8 MMOL/L (ref 3.5–5.2)
PROT SERPL-MCNC: 7.7 G/DL (ref 6–8.5)
RBC # BLD AUTO: 5.51 10*6/MM3 (ref 3.77–5.28)
SODIUM BLD-SCNC: 137 MMOL/L (ref 136–145)
STRESS TARGET HR: 135 BPM
WBC NRBC COR # BLD: 10.28 10*3/MM3 (ref 3.4–10.8)

## 2019-05-13 PROCEDURE — 94799 UNLISTED PULMONARY SVC/PX: CPT

## 2019-05-13 PROCEDURE — 36415 COLL VENOUS BLD VENIPUNCTURE: CPT | Performed by: INTERNAL MEDICINE

## 2019-05-13 PROCEDURE — 25010000002 ENOXAPARIN PER 10 MG: Performed by: INTERNAL MEDICINE

## 2019-05-13 PROCEDURE — 25010000002 FUROSEMIDE PER 20 MG: Performed by: INTERNAL MEDICINE

## 2019-05-13 PROCEDURE — 93306 TTE W/DOPPLER COMPLETE: CPT | Performed by: INTERNAL MEDICINE

## 2019-05-13 PROCEDURE — 85025 COMPLETE CBC W/AUTO DIFF WBC: CPT | Performed by: INTERNAL MEDICINE

## 2019-05-13 PROCEDURE — 25010000002 METHYLPREDNISOLONE PER 40 MG: Performed by: INTERNAL MEDICINE

## 2019-05-13 PROCEDURE — 83036 HEMOGLOBIN GLYCOSYLATED A1C: CPT | Performed by: INTERNAL MEDICINE

## 2019-05-13 PROCEDURE — 63710000001 INSULIN LISPRO (HUMAN) PER 5 UNITS: Performed by: HOSPITALIST

## 2019-05-13 PROCEDURE — 82962 GLUCOSE BLOOD TEST: CPT

## 2019-05-13 PROCEDURE — 63710000001 INSULIN GLARGINE PER 5 UNITS: Performed by: INTERNAL MEDICINE

## 2019-05-13 PROCEDURE — 80053 COMPREHEN METABOLIC PANEL: CPT | Performed by: INTERNAL MEDICINE

## 2019-05-13 PROCEDURE — 63710000001 INSULIN LISPRO (HUMAN) PER 5 UNITS: Performed by: INTERNAL MEDICINE

## 2019-05-13 PROCEDURE — 99221 1ST HOSP IP/OBS SF/LOW 40: CPT | Performed by: INTERNAL MEDICINE

## 2019-05-13 PROCEDURE — 93306 TTE W/DOPPLER COMPLETE: CPT

## 2019-05-13 RX ADMIN — METHYLPREDNISOLONE SODIUM SUCCINATE 40 MG: 40 INJECTION, POWDER, FOR SOLUTION INTRAMUSCULAR; INTRAVENOUS at 23:39

## 2019-05-13 RX ADMIN — SODIUM CHLORIDE, PRESERVATIVE FREE 10 ML: 5 INJECTION INTRAVENOUS at 08:44

## 2019-05-13 RX ADMIN — IPRATROPIUM BROMIDE AND ALBUTEROL SULFATE 3 ML: 2.5; .5 SOLUTION RESPIRATORY (INHALATION) at 15:15

## 2019-05-13 RX ADMIN — SODIUM CHLORIDE, PRESERVATIVE FREE 3 ML: 5 INJECTION INTRAVENOUS at 23:40

## 2019-05-13 RX ADMIN — FUROSEMIDE 40 MG: 10 INJECTION, SOLUTION INTRAMUSCULAR; INTRAVENOUS at 08:43

## 2019-05-13 RX ADMIN — SODIUM CHLORIDE SOLN NEBU 7% 4 ML: 7 NEBU SOLN at 23:26

## 2019-05-13 RX ADMIN — IPRATROPIUM BROMIDE AND ALBUTEROL SULFATE 3 ML: 2.5; .5 SOLUTION RESPIRATORY (INHALATION) at 03:39

## 2019-05-13 RX ADMIN — IPRATROPIUM BROMIDE AND ALBUTEROL SULFATE 3 ML: 2.5; .5 SOLUTION RESPIRATORY (INHALATION) at 08:09

## 2019-05-13 RX ADMIN — INSULIN GLARGINE 20 UNITS: 100 INJECTION, SOLUTION SUBCUTANEOUS at 21:27

## 2019-05-13 RX ADMIN — SODIUM CHLORIDE, PRESERVATIVE FREE 3 ML: 5 INJECTION INTRAVENOUS at 08:45

## 2019-05-13 RX ADMIN — INSULIN LISPRO 4 UNITS: 100 INJECTION, SOLUTION INTRAVENOUS; SUBCUTANEOUS at 11:49

## 2019-05-13 RX ADMIN — INSULIN LISPRO 20 UNITS: 100 INJECTION, SOLUTION INTRAVENOUS; SUBCUTANEOUS at 08:44

## 2019-05-13 RX ADMIN — FUROSEMIDE 40 MG: 10 INJECTION, SOLUTION INTRAMUSCULAR; INTRAVENOUS at 17:26

## 2019-05-13 RX ADMIN — METHYLPREDNISOLONE SODIUM SUCCINATE 40 MG: 40 INJECTION, POWDER, FOR SOLUTION INTRAMUSCULAR; INTRAVENOUS at 14:52

## 2019-05-13 RX ADMIN — INSULIN LISPRO 4 UNITS: 100 INJECTION, SOLUTION INTRAVENOUS; SUBCUTANEOUS at 17:26

## 2019-05-13 RX ADMIN — METHYLPREDNISOLONE SODIUM SUCCINATE 40 MG: 40 INJECTION, POWDER, FOR SOLUTION INTRAMUSCULAR; INTRAVENOUS at 06:39

## 2019-05-13 RX ADMIN — INSULIN LISPRO 24 UNITS: 100 INJECTION, SOLUTION INTRAVENOUS; SUBCUTANEOUS at 21:27

## 2019-05-13 RX ADMIN — ENOXAPARIN SODIUM 40 MG: 40 INJECTION SUBCUTANEOUS at 11:50

## 2019-05-13 RX ADMIN — IPRATROPIUM BROMIDE AND ALBUTEROL SULFATE 3 ML: 2.5; .5 SOLUTION RESPIRATORY (INHALATION) at 23:26

## 2019-05-13 RX ADMIN — IPRATROPIUM BROMIDE AND ALBUTEROL SULFATE 3 ML: 2.5; .5 SOLUTION RESPIRATORY (INHALATION) at 19:44

## 2019-05-13 RX ADMIN — SODIUM CHLORIDE SOLN NEBU 7% 4 ML: 7 NEBU SOLN at 15:15

## 2019-05-13 RX ADMIN — IPRATROPIUM BROMIDE AND ALBUTEROL SULFATE 3 ML: 2.5; .5 SOLUTION RESPIRATORY (INHALATION) at 10:55

## 2019-05-13 RX ADMIN — SODIUM CHLORIDE SOLN NEBU 7% 4 ML: 7 NEBU SOLN at 08:08

## 2019-05-13 RX ADMIN — INSULIN LISPRO 18 UNITS: 100 INJECTION, SOLUTION INTRAVENOUS; SUBCUTANEOUS at 06:39

## 2019-05-13 NOTE — PROGRESS NOTES
Name: Emi Voss ADMIT: 2019   : 1960  PCP: Provider, No Known    MRN: [id number] LOS: 1 days   AGE/SEX: 59 y.o. female  ROOM: S512/1   Subjective   Nearly needed transfer to the ICU yesterday for respiratory failure.  Appreciate pulmonology and cardiology attention to the patient.  She feels better after BiPAP today.  Still with dyspnea and nonproductive cough.  No chest pain or palpitations reported.  No nausea vomiting diarrhea.  I discussed her A1c value and that she will likely need insulin at discharge to treat her diabetes.  Also discussed that we expect worse exacerbation of her blood sugar here while she is on steroids acutely.    Objective   Vital Signs  Temp:  [97.4 °F (36.3 °C)-98.2 °F (36.8 °C)] 97.4 °F (36.3 °C)  Heart Rate:  [] 109  Resp:  [20-28] 22  BP: (121-136)/(70-76) 121/75  SpO2:  [87 %-95 %] 88 %  on  Flow (L/min):  [4-10] 4;   Device (Oxygen Therapy): nasal cannula;humidified  Body mass index is 27.46 kg/m².    Physical Exam   Constitutional: She is oriented to person, place, and time. She appears well-developed. No distress.   HENT:   Head: Atraumatic.   Nose: Nose normal.   Eyes: Conjunctivae and EOM are normal.   Neck: Normal range of motion. Neck supple.   Cardiovascular: Normal rate, regular rhythm and intact distal pulses.   Pulmonary/Chest: Effort normal. She has decreased breath sounds. She has wheezes (bilateral). She has rhonchi.   Abdominal: Soft. She exhibits no distension. There is no tenderness.   Musculoskeletal: Normal range of motion. She exhibits edema (1+).   Neurological: She is alert and oriented to person, place, and time.   Skin: Skin is warm and dry. She is not diaphoretic.   Psychiatric: She has a normal mood and affect. Her behavior is normal.   Nursing note and vitals reviewed.      Results Review:       I reviewed the patient's new clinical results.     I reviewed telemetry/EKG results, sinus rhythm      Results from last 7 days   Lab  Units 05/13/19  0448 05/12/19  0801   WBC 10*3/mm3 10.28 11.51*   HEMOGLOBIN g/dL 15.4 16.0*   PLATELETS 10*3/mm3 258 255     Results from last 7 days   Lab Units 05/13/19  0448 05/12/19  0801   SODIUM mmol/L 137 129*   POTASSIUM mmol/L 3.8 4.0   CHLORIDE mmol/L 91* 88*   CO2 mmol/L 32.4* 29.9*   BUN mg/dL 27* 16   CREATININE mg/dL 0.76 0.56*   GLUCOSE mg/dL 406* 385*   Estimated Creatinine Clearance: 76 mL/min (by C-G formula based on SCr of 0.76 mg/dL).  Results from last 7 days   Lab Units 05/13/19  0448 05/12/19  0801   CALCIUM mg/dL 9.3 9.4   ALBUMIN g/dL 2.90* 3.30*         enoxaparin 40 mg Subcutaneous Q24H   furosemide 40 mg Intravenous BID   insulin glargine 20 Units Subcutaneous Nightly   insulin lispro 0-24 Units Subcutaneous 4x Daily With Meals & Nightly   ipratropium-albuterol 3 mL Nebulization Q4H - RT   methylPREDNISolone sodium succinate 40 mg Intravenous Q8H   sodium chloride 3 mL Intravenous Q12H   sodium chloride 4 mL Nebulization Q8H - RT      Diet Regular; Cardiac, Consistent Carbohydrate      Assessment/Plan      Active Hospital Problems    Diagnosis  POA   • **Chronic obstructive pulmonary disease with acute exacerbation (CMS/HCC) [J44.1]  Yes   • Acute congestive heart failure (CMS/HCC) [I50.9]  Yes   • Hyponatremia [E87.1]  Yes   • Acute respiratory failure with hypoxia and hypercapnia (CMS/HCC) [J96.01, J96.02]  Yes   • Type 2 diabetes mellitus with hyperglycemia (CMS/HCC) [E11.65]  Yes   • Rhinovirus infection [B34.8]  Yes   • Tachycardia [R00.0]  Yes   • Noncompliance [Z91.19]  Not Applicable      Resolved Hospital Problems   No resolved problems to display.     - COPD AE: Acute respiratory distress has resolved.  IV steroid dose has been weaned some today.  Continue breathing treatments.  Rhinovirus infection present and no outpatient care.  Pulmonology following.   - Acute heart failure: Echo pending to determine EF formally however cardiology reports this looks like diastolic heart  failure.  Can continue IV diuresis with stable renal function currently.  If COPD is driving her respiratory failure more so than heart failure we can decrease the diuresis if needed.  Cardiology following.  - Acute mixed respiratory failure: Secondary to above.  BiPAP as needed.  O2 supplemental to be weaned to an oxygen saturation 88%.  - Diabetes mellitus type II: A1c is 12.4.  She was not on outpatient medication.  Continue Lantus and correctional insulin.  Can also start metformin if renal function remains stable tomorrow.  - Disposition: TBD    aHrley Dumont MD  [facility]  05/13/19  12:17 PM

## 2019-05-13 NOTE — PROGRESS NOTES
[facility]  [phone]  Alex Olivarez MD    Subjective:  LOS: 1    She feels better.  Her wheezing is slightly improved.  She did tolerate noninvasive last night.  She is on 4 L high flow with saturation 89 to 90%.  RT has instructions to maintain sats between 85 and 90% only.    Objective   Vital Signs past 24hrs    Temp range: Temp (24hrs), Av.8 °F (36.6 °C), Min:97.4 °F (36.3 °C), Max:98.2 °F (36.8 °C)    BP range: BP: (126-149)/(70-92) 126/70  Pulse range: Heart Rate:  [] 89  Resp rate range: Resp:  [20-28] 20    Device (Oxygen Therapy): high-flow nasal cannulaFlow (L/min):  [4-10] 6  Oxygen range:SpO2:  [87 %-96 %] 91 %      72.6 kg (160 lb); Body mass index is 27.46 kg/m².    Intake/Output Summary (Last 24 hours) at 2019 1024  Last data filed at 2019 1007  Gross per 24 hour   Intake 1200 ml   Output --   Net 1200 ml       Physical Exam   Constitutional: She appears well-developed.   HENT:   Head: Normocephalic.   Eyes: Pupils are equal, round, and reactive to light.   Cardiovascular: Normal rate and regular rhythm.   No murmur heard.  Pulmonary/Chest: Effort normal. No respiratory distress. She has decreased breath sounds. She has wheezes (moderate coarse). She has no rales.   Abdominal: Soft. Bowel sounds are normal. She exhibits no mass. There is no tenderness.   Musculoskeletal: She exhibits no edema.   Skin: No rash noted.     Results Review:    I have reviewed the laboratory and imaging data since the last note by [facility] physician.  My annotations are noted in assessment and plan.    Medication Review:  I have reviewed the current MAR.  My annotations are noted in assessment and plan.       Plan   PCCM Problems  Acute and chronic respiratory failure, hypoxia and hypercapnia  COPD exacerbation  Acute rhinovirus infection  Current cigarette smoker  Pulmonary infiltrates consistent with pulmonary vascular congestion  Relevant Medical Diagnoses  Diabetes mellitus,  uncontrolled  Hyperglycemia due to steroids  Acute hyponatremia, resolved    Plan of Treatment  She is expectorating some.  She is much improved.  Continue with nebulizer treatments.    She actually tolerated the noninvasive last night.  I discussed with the RN about further reducing the oxygen flows to a regular nasal cannula.  We will see if she tolerates.    Continued moderate course wheezing.  Continue with current nebulizer treatments and steroids.    Must quit smoking.    Management of blood sugars per primary team.    Hyponatremia has improved/resolved.    She must quit smoking and requires a low-dose screening CT as an outpatient.    Alex Olivarez MD  05/13/19  10:24 AM    Part of this note may be an electronic transcription/translation of spoken language to printed text using the Dragon Dictation System.

## 2019-05-13 NOTE — PROGRESS NOTES
"Discharge Planning Assessment  [facility]     Patient Name: Emi Voss  MRN: [id number]  Today's Date: 5/13/2019    Admit Date: 5/12/2019    Discharge Needs Assessment     Row Name 05/13/19 1351       Living Environment    Lives With  child(funmi), adult    Current Living Arrangements  home/apartment/condo    Primary Care Provided by  self    Quality of Family Relationships  supportive    Able to Return to Prior Arrangements  yes       Transition Planning    Patient/Family Anticipates Transition to  home with family    Patient/Family Anticipated Services at Transition      Transportation Anticipated  family or friend will provide       Discharge Needs Assessment    Concerns to be Addressed  adjustment to diagnosis/illness;basic needs    Equipment Currently Used at Home  none        Discharge Plan     Row Name 05/13/19 1351       Plan    Plan  Plans are home.      Patient/Family in Agreement with Plan  yes    Plan Comments  CCP spoke with pt @ bedside, confirmed face sheet information.  Pt states she has no PMD, and she took no meds, so no primary pharmacy. Gave pt info on meds to bed, and she would like to be enrolled in that program. CCP gave list of G doctors.  Pt states she was non compliant with her DM in the past, as she thought she was controlling BS with diet, and \" handling it herself\".  Pt states she was independent with ADL's.  Instructed that CCP would follow and assist with dc needs. Dina Bianchi RN        Destination      No service coordination in this encounter.      Durable Medical Equipment      No service coordination in this encounter.      Dialysis/Infusion      No service coordination in this encounter.      Home Medical Care      No service coordination in this encounter.      Therapy      No service coordination in this encounter.      Community Resources      No service coordination in this encounter.          Demographic Summary    No documentation.       Functional " Status    No documentation.       Psychosocial    No documentation.       Abuse/Neglect    No documentation.       Legal     Row Name 05/13/19 6839       Financial/Legal    Who Manages Finances if Patient Unable  No living will or HCS        Substance Abuse    No documentation.       Patient Forms     Row Name 05/13/19 1862       Patient Forms    Important Message from Medicare (IMM)  -- IMM on 5/12 signed/ dated and timed.            Dina Bianchi RN

## 2019-05-14 LAB
ANION GAP SERPL CALCULATED.3IONS-SCNC: 7.7 MMOL/L
BUN BLD-MCNC: 35 MG/DL (ref 8–23)
BUN/CREAT SERPL: 55.6 (ref 7–25)
CALCIUM SPEC-SCNC: 9.4 MG/DL (ref 8.6–10.5)
CHLORIDE SERPL-SCNC: 84 MMOL/L (ref 98–107)
CO2 SERPL-SCNC: 39.3 MMOL/L (ref 22–29)
CREAT BLD-MCNC: 0.63 MG/DL (ref 0.57–1)
DEPRECATED RDW RBC AUTO: 44.8 FL (ref 37–54)
ERYTHROCYTE [DISTWIDTH] IN BLOOD BY AUTOMATED COUNT: 13.2 % (ref 12.3–15.4)
GFR SERPL CREATININE-BSD FRML MDRD: 96 ML/MIN/1.73
GLUCOSE BLD-MCNC: 240 MG/DL (ref 65–99)
GLUCOSE BLDC GLUCOMTR-MCNC: 142 MG/DL (ref 70–130)
GLUCOSE BLDC GLUCOMTR-MCNC: 228 MG/DL (ref 70–130)
GLUCOSE BLDC GLUCOMTR-MCNC: 272 MG/DL (ref 70–130)
GLUCOSE BLDC GLUCOMTR-MCNC: 364 MG/DL (ref 70–130)
GLUCOSE BLDC GLUCOMTR-MCNC: 407 MG/DL (ref 70–130)
HCT VFR BLD AUTO: 50.8 % (ref 34–46.6)
HGB BLD-MCNC: 15.3 G/DL (ref 12–15.9)
MAGNESIUM SERPL-MCNC: 2.1 MG/DL (ref 1.6–2.4)
MCH RBC QN AUTO: 27.6 PG (ref 26.6–33)
MCHC RBC AUTO-ENTMCNC: 30.1 G/DL (ref 31.5–35.7)
MCV RBC AUTO: 91.7 FL (ref 79–97)
PLATELET # BLD AUTO: 280 10*3/MM3 (ref 140–450)
PMV BLD AUTO: 10.5 FL (ref 6–12)
POTASSIUM BLD-SCNC: 3.8 MMOL/L (ref 3.5–5.2)
RBC # BLD AUTO: 5.54 10*6/MM3 (ref 3.77–5.28)
SODIUM BLD-SCNC: 131 MMOL/L (ref 136–145)
URATE SERPL-MCNC: 6.4 MG/DL (ref 2.4–5.7)
WBC NRBC COR # BLD: 10.94 10*3/MM3 (ref 3.4–10.8)

## 2019-05-14 PROCEDURE — 93010 ELECTROCARDIOGRAM REPORT: CPT | Performed by: INTERNAL MEDICINE

## 2019-05-14 PROCEDURE — 84550 ASSAY OF BLOOD/URIC ACID: CPT | Performed by: INTERNAL MEDICINE

## 2019-05-14 PROCEDURE — 93005 ELECTROCARDIOGRAM TRACING: CPT | Performed by: INTERNAL MEDICINE

## 2019-05-14 PROCEDURE — 94799 UNLISTED PULMONARY SVC/PX: CPT

## 2019-05-14 PROCEDURE — 63710000001 INSULIN GLARGINE PER 5 UNITS: Performed by: INTERNAL MEDICINE

## 2019-05-14 PROCEDURE — 85027 COMPLETE CBC AUTOMATED: CPT | Performed by: INTERNAL MEDICINE

## 2019-05-14 PROCEDURE — 82962 GLUCOSE BLOOD TEST: CPT

## 2019-05-14 PROCEDURE — 83735 ASSAY OF MAGNESIUM: CPT | Performed by: INTERNAL MEDICINE

## 2019-05-14 PROCEDURE — 25010000002 ENOXAPARIN PER 10 MG: Performed by: INTERNAL MEDICINE

## 2019-05-14 PROCEDURE — 25010000002 METHYLPREDNISOLONE PER 40 MG: Performed by: INTERNAL MEDICINE

## 2019-05-14 PROCEDURE — 80048 BASIC METABOLIC PNL TOTAL CA: CPT | Performed by: INTERNAL MEDICINE

## 2019-05-14 PROCEDURE — 25010000002 FUROSEMIDE PER 20 MG: Performed by: INTERNAL MEDICINE

## 2019-05-14 PROCEDURE — 63710000001 INSULIN LISPRO (HUMAN) PER 5 UNITS: Performed by: INTERNAL MEDICINE

## 2019-05-14 PROCEDURE — 99232 SBSQ HOSP IP/OBS MODERATE 35: CPT | Performed by: INTERNAL MEDICINE

## 2019-05-14 RX ORDER — PREDNISONE 20 MG/1
40 TABLET ORAL
Status: DISCONTINUED | OUTPATIENT
Start: 2019-05-15 | End: 2019-05-15

## 2019-05-14 RX ORDER — INSULIN GLARGINE 100 [IU]/ML
35 INJECTION, SOLUTION SUBCUTANEOUS NIGHTLY
Status: DISCONTINUED | OUTPATIENT
Start: 2019-05-14 | End: 2019-05-15

## 2019-05-14 RX ORDER — METHYLPREDNISOLONE SODIUM SUCCINATE 40 MG/ML
40 INJECTION, POWDER, LYOPHILIZED, FOR SOLUTION INTRAMUSCULAR; INTRAVENOUS EVERY 12 HOURS
Status: COMPLETED | OUTPATIENT
Start: 2019-05-14 | End: 2019-05-14

## 2019-05-14 RX ORDER — METHYLPREDNISOLONE SODIUM SUCCINATE 40 MG/ML
40 INJECTION, POWDER, LYOPHILIZED, FOR SOLUTION INTRAMUSCULAR; INTRAVENOUS EVERY 12 HOURS
Status: DISCONTINUED | OUTPATIENT
Start: 2019-05-14 | End: 2019-05-14

## 2019-05-14 RX ADMIN — IPRATROPIUM BROMIDE AND ALBUTEROL SULFATE 3 ML: 2.5; .5 SOLUTION RESPIRATORY (INHALATION) at 22:56

## 2019-05-14 RX ADMIN — INSULIN LISPRO 20 UNITS: 100 INJECTION, SOLUTION INTRAVENOUS; SUBCUTANEOUS at 11:16

## 2019-05-14 RX ADMIN — IPRATROPIUM BROMIDE AND ALBUTEROL SULFATE 3 ML: 2.5; .5 SOLUTION RESPIRATORY (INHALATION) at 16:25

## 2019-05-14 RX ADMIN — SODIUM CHLORIDE SOLN NEBU 7% 4 ML: 7 NEBU SOLN at 22:56

## 2019-05-14 RX ADMIN — SODIUM CHLORIDE, PRESERVATIVE FREE 3 ML: 5 INJECTION INTRAVENOUS at 08:18

## 2019-05-14 RX ADMIN — SODIUM CHLORIDE SOLN NEBU 7% 4 ML: 7 NEBU SOLN at 16:25

## 2019-05-14 RX ADMIN — INSULIN LISPRO 8 UNITS: 100 INJECTION, SOLUTION INTRAVENOUS; SUBCUTANEOUS at 08:18

## 2019-05-14 RX ADMIN — IPRATROPIUM BROMIDE AND ALBUTEROL SULFATE 3 ML: 2.5; .5 SOLUTION RESPIRATORY (INHALATION) at 18:52

## 2019-05-14 RX ADMIN — METFORMIN HYDROCHLORIDE 500 MG: 500 TABLET ORAL at 17:02

## 2019-05-14 RX ADMIN — IPRATROPIUM BROMIDE AND ALBUTEROL SULFATE 3 ML: 2.5; .5 SOLUTION RESPIRATORY (INHALATION) at 13:07

## 2019-05-14 RX ADMIN — ENOXAPARIN SODIUM 40 MG: 40 INJECTION SUBCUTANEOUS at 11:16

## 2019-05-14 RX ADMIN — INSULIN LISPRO 12 UNITS: 100 INJECTION, SOLUTION INTRAVENOUS; SUBCUTANEOUS at 21:07

## 2019-05-14 RX ADMIN — IPRATROPIUM BROMIDE AND ALBUTEROL SULFATE 3 ML: 2.5; .5 SOLUTION RESPIRATORY (INHALATION) at 03:20

## 2019-05-14 RX ADMIN — INSULIN GLARGINE 35 UNITS: 100 INJECTION, SOLUTION SUBCUTANEOUS at 21:07

## 2019-05-14 RX ADMIN — METHYLPREDNISOLONE SODIUM SUCCINATE 40 MG: 40 INJECTION, POWDER, FOR SOLUTION INTRAMUSCULAR; INTRAVENOUS at 21:07

## 2019-05-14 RX ADMIN — FUROSEMIDE 40 MG: 10 INJECTION, SOLUTION INTRAMUSCULAR; INTRAVENOUS at 08:17

## 2019-05-14 RX ADMIN — SODIUM CHLORIDE, PRESERVATIVE FREE 3 ML: 5 INJECTION INTRAVENOUS at 23:21

## 2019-05-14 RX ADMIN — METHYLPREDNISOLONE SODIUM SUCCINATE 40 MG: 40 INJECTION, POWDER, FOR SOLUTION INTRAMUSCULAR; INTRAVENOUS at 08:17

## 2019-05-14 NOTE — NURSING NOTE
"Attempted to allow pt to inject own insulin but pt refused. Pt stated she doesn't want to do it because she has to do it at home and she has already done it her first \"go round\".   "

## 2019-05-14 NOTE — PROGRESS NOTES
[facility]  [phone]  Alex Olivarez MD    Subjective:  LOS: 2    Continues to improve. Wants to know when she can go home. On 2-3 NC.    Objective   Vital Signs past 24hrs    Temp range: Temp (24hrs), Av.8 °F (36.6 °C), Min:97.5 °F (36.4 °C), Max:98.2 °F (36.8 °C)    BP range: BP: (121-138)/(67-95) 125/95  Pulse range: Heart Rate:  [] 95  Resp rate range: Resp:  [16-22] 20    Device (Oxygen Therapy): nasal cannulaFlow (L/min):  [3-6] 3  Oxygen range:SpO2:  [88 %-94 %] 94 %      75.3 kg (166 lb 1.6 oz); Body mass index is 28.51 kg/m².    Intake/Output Summary (Last 24 hours) at 2019 0837  Last data filed at 2019 0819  Gross per 24 hour   Intake 840 ml   Output 700 ml   Net 140 ml       Physical Exam   Constitutional: She appears well-developed.   HENT:   Head: Normocephalic.   Eyes: Pupils are equal, round, and reactive to light.   Cardiovascular: Normal rate and regular rhythm.   No murmur heard.  Pulmonary/Chest: Effort normal. No respiratory distress. She has decreased breath sounds. She has wheezes (mild coarse). She has no rales.   Abdominal: Soft. Bowel sounds are normal. She exhibits no mass. There is no tenderness.   Musculoskeletal: She exhibits no edema.   Skin: No rash noted.     Results Review:    I have reviewed the laboratory and imaging data since the last note by [facility] physician.  My annotations are noted in assessment and plan.    Medication Review:  I have reviewed the current MAR.  My annotations are noted in assessment and plan.       Plan   PCCM Problems  Acute and chronic respiratory failure, hypoxia and hypercapnia  COPD exacerbation  Acute rhinovirus infection  Current cigarette smoker  Pulmonary infiltrates consistent with pulmonary vascular congestion  Relevant Medical Diagnoses  Diabetes mellitus, uncontrolled  Hyperglycemia due to steroids  Acute hyponatremia, resolved    Plan of Treatment  She is expectorating some.  She is much improved. Still some  wheezing. Continue with nebulizer treatments. Today bid medrol and prednisone from the morning.    Did not use NIV. DC same. On low flow NC. ABG tomorrow and walkox for home O2 requirements.    Management of blood sugars per primary team.    Hyponatremia per primary team.    She must quit smoking and requires a low-dose screening CT as an outpatient.    Alex Olivarez MD  05/14/19  8:37 AM    Part of this note may be an electronic transcription/translation of spoken language to printed text using the Dragon Dictation System.

## 2019-05-14 NOTE — CONSULTS
"Diabetes Education  Assessment/Teaching    Patient Name:  Emi Voss  YOB: 1960  MRN: [id number]  Admit Date:  5/12/2019      Assessment Date:  5/14/2019    Most Recent Value   General Information    Height  162.6 cm (64\")   Height Method  Stated   Weight  75.3 kg (166 lb 1.6 oz)   Weight Method  Bed scale   Pregnancy Assessment   Diabetes History   What type of diabetes do you have?  Type 2   Length of Diabetes Diagnosis  10 + years   Current DM knowledge  fair   Have you had diabetes education/teaching in the past?  no   Do you test your blood sugar at home?  no   Have you had low blood sugar? (<70mg/dl)  yes   How often do you have low blood sugar?  rare   Do you have any diabetes complications?  recurring infections   Education Preferences   Nutrition Information   Assessment Topics   Healthy Eating - Assessment  Needs education   Being Active - Assessment  Needs education   Taking Medication - Assessment  Needs education   Problem Solving - Assessment  Needs education   Reducing Risk - Assessment  Needs education   Healthy Coping - Assessment  Competent   Monitoring - Assessment  Needs education   DM Goals            Most Recent Value   DM Education Needs   Meter  Needs meter   Frequency of Testing  2 times a day [She has had a meter in the past.  She currently does not check her blood sugars.  ]   Medication  Oral, Insulin, Actions, Side effects, Administration, Pen   Problem Solving  Hypoglycemia, Hyperglycemia, Sick days, Signs, Symptoms   Reducing Risks  Foot care, Cardiovascular, Eye exam, A1C testing   Physical Activity  None   Physical Activity Frequency  Discussed exercise importance   Healthy Coping  Appropriate   Discharge Plan  Home   Motivation  Engaged, Moderate   Teaching Method  Explanation, Discussion, Demonstration, Handouts, Teach back   Patient Response  Verbalized understanding, Demonstrates adequately            Other Comments:  Patient has used insulin in the past via " vial/syringe method.  Instructed her on how to use insulin pen and she was able to demonstrate correctly using teach back.  I encouraged her to check her blood sugars at least 2 times daily.  I explained to her the one of the side effects of steroids is elevated blood sugars.  Thank you for this referral.  Please reconsult if needed.        Electronically signed by:  Jes Fowler RN  05/14/19 11:23 AM

## 2019-05-14 NOTE — PLAN OF CARE
Problem: Patient Care Overview  Goal: Plan of Care Review  Outcome: Ongoing (interventions implemented as appropriate)   05/14/19 0601   Coping/Psychosocial   Plan of Care Reviewed With patient   Plan of Care Review   Progress improving   OTHER   Outcome Summary VSS. Pt a/o x4. no new complaints overnight. oxygen WNL. will CTM     Goal: Discharge Needs Assessment  Outcome: Ongoing (interventions implemented as appropriate)      Problem: Fall Risk (Adult)  Goal: Identify Related Risk Factors and Signs and Symptoms  Outcome: Ongoing (interventions implemented as appropriate)    Goal: Absence of Fall  Outcome: Ongoing (interventions implemented as appropriate)      Problem: Diabetes, Type 2 (Adult)  Goal: Signs and Symptoms of Listed Potential Problems Will be Absent, Minimized or Managed (Diabetes, Type 2)  Outcome: Ongoing (interventions implemented as appropriate)      Problem: Chronic Obstructive Pulmonary Disease (Adult)  Goal: Signs and Symptoms of Listed Potential Problems Will be Absent, Minimized or Managed (Chronic Obstructive Pulmonary Disease)  Outcome: Ongoing (interventions implemented as appropriate)      Problem: Cardiac: Heart Failure (Adult)  Goal: Signs and Symptoms of Listed Potential Problems Will be Absent, Minimized or Managed (Cardiac: Heart Failure)  Outcome: Ongoing (interventions implemented as appropriate)

## 2019-05-14 NOTE — PROGRESS NOTES
Name: Emi Voss ADMIT: 2019   : 1960  PCP: Provider, No Known    MRN: [id number] LOS: 2 days   AGE/SEX: 59 y.o. female  ROOM: [facility]/1   Subjective   Dyspnea improving now and weaned to 3L. BG still elevated. No CP NVD. Edema has essentially resolved.    Objective   Vital Signs  Temp:  [97.5 °F (36.4 °C)-98.2 °F (36.8 °C)] 97.6 °F (36.4 °C)  Heart Rate:  [] 79  Resp:  [16-20] 18  BP: (123-138)/(67-95) 125/95  SpO2:  [90 %-94 %] 90 %  on  Flow (L/min):  [3-4] 3;   Device (Oxygen Therapy): humidified;nasal cannula  Body mass index is 28.51 kg/m².    Physical Exam   Constitutional: She is oriented to person, place, and time. She appears well-developed. No distress.   HENT:   Head: Atraumatic.   Nose: Nose normal.   Eyes: Conjunctivae and EOM are normal.   Neck: Normal range of motion. Neck supple.   Cardiovascular: Normal rate, regular rhythm and intact distal pulses.   Pulmonary/Chest: Effort normal. She has decreased breath sounds (improved). She has wheezes (bilateral). She has rhonchi.   Abdominal: Soft. She exhibits no distension. There is no tenderness.   Musculoskeletal: Normal range of motion. She exhibits no edema.   Neurological: She is alert and oriented to person, place, and time.   Skin: Skin is warm and dry. She is not diaphoretic.   Psychiatric: She has a normal mood and affect. Her behavior is normal.   Nursing note and vitals reviewed.      Results Review:       I reviewed the patient's new clinical results.     I reviewed telemetry/EKG results, sinus rhythm     I reviewed other test results, agree with interpretation.      Results from last 7 days   Lab Units 19  0514 19  0448 19  0801   WBC 10*3/mm3 10.94* 10.28 11.51*   HEMOGLOBIN g/dL 15.3 15.4 16.0*   PLATELETS 10*3/mm3 280 258 255     Results from last 7 days   Lab Units 19  0514 19  0448 19  0801   SODIUM mmol/L 131* 137 129*   POTASSIUM mmol/L 3.8 3.8 4.0   CHLORIDE mmol/L 84* 91*  88*   CO2 mmol/L 39.3* 32.4* 29.9*   BUN mg/dL 35* 27* 16   CREATININE mg/dL 0.63 0.76 0.56*   GLUCOSE mg/dL 240* 406* 385*   Estimated Creatinine Clearance: 93.1 mL/min (by C-G formula based on SCr of 0.63 mg/dL).  Results from last 7 days   Lab Units 05/14/19  0514 05/13/19  0448 05/12/19  0801   CALCIUM mg/dL 9.4 9.3 9.4   ALBUMIN g/dL  --  2.90* 3.30*   MAGNESIUM mg/dL 2.1  --   --          enoxaparin 40 mg Subcutaneous Q24H   furosemide 40 mg Intravenous BID   insulin glargine 35 Units Subcutaneous Nightly   insulin lispro 0-24 Units Subcutaneous 4x Daily With Meals & Nightly   ipratropium-albuterol 3 mL Nebulization Q4H - RT   metFORMIN 500 mg Oral BID With Meals   methylPREDNISolone sodium succinate 40 mg Intravenous Q12H   [START ON 5/15/2019] predniSONE 40 mg Oral Daily With Breakfast   sodium chloride 3 mL Intravenous Q12H   sodium chloride 4 mL Nebulization Q8H - RT      Diet Regular; Cardiac, Consistent Carbohydrate      Assessment/Plan      Active Hospital Problems    Diagnosis  POA   • **Chronic obstructive pulmonary disease with acute exacerbation (CMS/HCC) [J44.1]  Yes   • Acute congestive heart failure (CMS/HCC) [I50.9]  Yes   • Hyponatremia [E87.1]  Yes   • Acute respiratory failure with hypoxia and hypercapnia (CMS/HCC) [J96.01, J96.02]  Yes   • Type 2 diabetes mellitus with hyperglycemia (CMS/HCC) [E11.65]  Yes   • Rhinovirus infection [B34.8]  Yes   • Tachycardia [R00.0]  Yes   • Noncompliance [Z91.19]  Not Applicable      Resolved Hospital Problems   No resolved problems to display.     - COPD AE: IV steroid today and planned wean to PO tomorrow. Continue breathing treatments. Pulmonology following.  - Rhinovirus Infection  - Acute Diastolic Heart Failure: Echo with preserved EF and only grade 1 diastolic dysfunction. Edema has resolved so will hold of on additional lasix today. Cardiology following.  - Acute mixed respiratory failure: Secondary to above.  BiPAP as needed.  O2 supplemental wean  as tolerated.  - Diabetes mellitus type II: A1c is 12.4.  Needed 72 units of insulin yesterday. Some of this is steroid induced but she will need insulin at discharge. Increase lantus to 35 units and monitor requirements. Start metformin.  - Disposition: Home/few days    Harley Dumont MD  [facility]  05/14/19  12:40 PM

## 2019-05-15 LAB
ANION GAP SERPL CALCULATED.3IONS-SCNC: 8.8 MMOL/L
ARTERIAL PATENCY WRIST A: POSITIVE
ATMOSPHERIC PRESS: 751.2 MMHG
BASE EXCESS BLDA CALC-SCNC: 12.5 MMOL/L (ref 0–2)
BDY SITE: ABNORMAL
BUN BLD-MCNC: 24 MG/DL (ref 8–23)
BUN/CREAT SERPL: 44.4 (ref 7–25)
CALCIUM SPEC-SCNC: 9.9 MG/DL (ref 8.6–10.5)
CHLORIDE SERPL-SCNC: 88 MMOL/L (ref 98–107)
CO2 SERPL-SCNC: 40.2 MMOL/L (ref 22–29)
CREAT BLD-MCNC: 0.54 MG/DL (ref 0.57–1)
DEPRECATED RDW RBC AUTO: 45.7 FL (ref 37–54)
ERYTHROCYTE [DISTWIDTH] IN BLOOD BY AUTOMATED COUNT: 13.3 % (ref 12.3–15.4)
GAS FLOW AIRWAY: 2 LPM
GFR SERPL CREATININE-BSD FRML MDRD: 115 ML/MIN/1.73
GLUCOSE BLD-MCNC: 74 MG/DL (ref 65–99)
GLUCOSE BLDC GLUCOMTR-MCNC: 157 MG/DL (ref 70–130)
GLUCOSE BLDC GLUCOMTR-MCNC: 249 MG/DL (ref 70–130)
GLUCOSE BLDC GLUCOMTR-MCNC: 281 MG/DL (ref 70–130)
GLUCOSE BLDC GLUCOMTR-MCNC: 62 MG/DL (ref 70–130)
GLUCOSE BLDC GLUCOMTR-MCNC: 77 MG/DL (ref 70–130)
HCO3 BLDA-SCNC: 41.8 MMOL/L (ref 22–28)
HCT VFR BLD AUTO: 53.4 % (ref 34–46.6)
HGB BLD-MCNC: 15.9 G/DL (ref 12–15.9)
MCH RBC QN AUTO: 27.9 PG (ref 26.6–33)
MCHC RBC AUTO-ENTMCNC: 29.8 G/DL (ref 31.5–35.7)
MCV RBC AUTO: 93.7 FL (ref 79–97)
MODALITY: ABNORMAL
PCO2 BLDA: 64.7 MM HG (ref 35–45)
PH BLDA: 7.42 PH UNITS (ref 7.35–7.45)
PLATELET # BLD AUTO: 293 10*3/MM3 (ref 140–450)
PMV BLD AUTO: 9.9 FL (ref 6–12)
PO2 BLDA: 50.2 MM HG (ref 80–100)
POTASSIUM BLD-SCNC: 3.6 MMOL/L (ref 3.5–5.2)
RBC # BLD AUTO: 5.7 10*6/MM3 (ref 3.77–5.28)
SAO2 % BLDCOA: 84 % (ref 92–99)
SET MECH RESP RATE: 28
SODIUM BLD-SCNC: 137 MMOL/L (ref 136–145)
WBC NRBC COR # BLD: 12.29 10*3/MM3 (ref 3.4–10.8)

## 2019-05-15 PROCEDURE — 94799 UNLISTED PULMONARY SVC/PX: CPT

## 2019-05-15 PROCEDURE — 63710000001 PREDNISONE PER 1 MG: Performed by: INTERNAL MEDICINE

## 2019-05-15 PROCEDURE — 82962 GLUCOSE BLOOD TEST: CPT

## 2019-05-15 PROCEDURE — 85027 COMPLETE CBC AUTOMATED: CPT | Performed by: INTERNAL MEDICINE

## 2019-05-15 PROCEDURE — 94618 PULMONARY STRESS TESTING: CPT

## 2019-05-15 PROCEDURE — 82803 BLOOD GASES ANY COMBINATION: CPT

## 2019-05-15 PROCEDURE — 63710000001 INSULIN LISPRO (HUMAN) PER 5 UNITS: Performed by: INTERNAL MEDICINE

## 2019-05-15 PROCEDURE — 36600 WITHDRAWAL OF ARTERIAL BLOOD: CPT

## 2019-05-15 PROCEDURE — 99232 SBSQ HOSP IP/OBS MODERATE 35: CPT | Performed by: INTERNAL MEDICINE

## 2019-05-15 PROCEDURE — 80048 BASIC METABOLIC PNL TOTAL CA: CPT | Performed by: INTERNAL MEDICINE

## 2019-05-15 PROCEDURE — 63710000001 INSULIN GLARGINE PER 5 UNITS: Performed by: HOSPITALIST

## 2019-05-15 PROCEDURE — 25010000002 ENOXAPARIN PER 10 MG: Performed by: INTERNAL MEDICINE

## 2019-05-15 RX ORDER — SODIUM CHLORIDE FOR INHALATION 7 %
4 VIAL, NEBULIZER (ML) INHALATION 2 TIMES DAILY
Qty: 240 ML | Refills: 2 | Status: SHIPPED | OUTPATIENT
Start: 2019-05-15 | End: 2019-08-13

## 2019-05-15 RX ORDER — INSULIN GLARGINE 100 [IU]/ML
20 INJECTION, SOLUTION SUBCUTANEOUS NIGHTLY
Status: DISCONTINUED | OUTPATIENT
Start: 2019-05-15 | End: 2019-05-16 | Stop reason: HOSPADM

## 2019-05-15 RX ORDER — PREDNISONE 10 MG/1
TABLET ORAL
Qty: 54 TABLET | Refills: 0 | Status: SHIPPED | OUTPATIENT
Start: 2019-05-15

## 2019-05-15 RX ORDER — ALBUTEROL SULFATE 2.5 MG/3ML
2.5 SOLUTION RESPIRATORY (INHALATION) EVERY 4 HOURS PRN
Qty: 360 ML | Refills: 2 | Status: SHIPPED | OUTPATIENT
Start: 2019-05-15 | End: 2019-08-13

## 2019-05-15 RX ORDER — PREDNISONE 20 MG/1
40 TABLET ORAL 2 TIMES DAILY WITH MEALS
Status: DISCONTINUED | OUTPATIENT
Start: 2019-05-15 | End: 2019-05-16 | Stop reason: HOSPADM

## 2019-05-15 RX ADMIN — SODIUM CHLORIDE, PRESERVATIVE FREE 3 ML: 5 INJECTION INTRAVENOUS at 08:42

## 2019-05-15 RX ADMIN — IPRATROPIUM BROMIDE AND ALBUTEROL SULFATE 3 ML: 2.5; .5 SOLUTION RESPIRATORY (INHALATION) at 23:12

## 2019-05-15 RX ADMIN — INSULIN LISPRO 12 UNITS: 100 INJECTION, SOLUTION INTRAVENOUS; SUBCUTANEOUS at 17:57

## 2019-05-15 RX ADMIN — IPRATROPIUM BROMIDE AND ALBUTEROL SULFATE 3 ML: 2.5; .5 SOLUTION RESPIRATORY (INHALATION) at 14:38

## 2019-05-15 RX ADMIN — PREDNISONE 40 MG: 20 TABLET ORAL at 08:41

## 2019-05-15 RX ADMIN — IPRATROPIUM BROMIDE AND ALBUTEROL SULFATE 3 ML: 2.5; .5 SOLUTION RESPIRATORY (INHALATION) at 19:24

## 2019-05-15 RX ADMIN — METFORMIN HYDROCHLORIDE 500 MG: 500 TABLET ORAL at 08:41

## 2019-05-15 RX ADMIN — INSULIN LISPRO 8 UNITS: 100 INJECTION, SOLUTION INTRAVENOUS; SUBCUTANEOUS at 22:03

## 2019-05-15 RX ADMIN — SODIUM CHLORIDE SOLN NEBU 7% 4 ML: 7 NEBU SOLN at 07:38

## 2019-05-15 RX ADMIN — IPRATROPIUM BROMIDE AND ALBUTEROL SULFATE 3 ML: 2.5; .5 SOLUTION RESPIRATORY (INHALATION) at 03:25

## 2019-05-15 RX ADMIN — INSULIN GLARGINE 20 UNITS: 100 INJECTION, SOLUTION SUBCUTANEOUS at 22:04

## 2019-05-15 RX ADMIN — SODIUM CHLORIDE SOLN NEBU 7% 4 ML: 7 NEBU SOLN at 23:12

## 2019-05-15 RX ADMIN — IPRATROPIUM BROMIDE AND ALBUTEROL SULFATE 3 ML: 2.5; .5 SOLUTION RESPIRATORY (INHALATION) at 11:49

## 2019-05-15 RX ADMIN — METFORMIN HYDROCHLORIDE 500 MG: 500 TABLET ORAL at 17:57

## 2019-05-15 RX ADMIN — IPRATROPIUM BROMIDE AND ALBUTEROL SULFATE 3 ML: 2.5; .5 SOLUTION RESPIRATORY (INHALATION) at 07:30

## 2019-05-15 RX ADMIN — SODIUM CHLORIDE, PRESERVATIVE FREE 3 ML: 5 INJECTION INTRAVENOUS at 22:04

## 2019-05-15 RX ADMIN — ENOXAPARIN SODIUM 40 MG: 40 INJECTION SUBCUTANEOUS at 12:03

## 2019-05-15 RX ADMIN — SODIUM CHLORIDE SOLN NEBU 7% 4 ML: 7 NEBU SOLN at 14:43

## 2019-05-15 RX ADMIN — INSULIN LISPRO 4 UNITS: 100 INJECTION, SOLUTION INTRAVENOUS; SUBCUTANEOUS at 12:03

## 2019-05-15 RX ADMIN — PREDNISONE 40 MG: 20 TABLET ORAL at 17:57

## 2019-05-15 RX ADMIN — ACETAMINOPHEN 650 MG: 325 TABLET, FILM COATED ORAL at 06:38

## 2019-05-15 NOTE — PROGRESS NOTES
[facility]  [phone]  lAex Olivarez MD    Subjective:  LOS: 3    She states she feels great and wants to go home. Admits to wheezing. Agrees to fu our office.    Objective   Vital Signs past 24hrs    Temp range: Temp (24hrs), Av.6 °F (36.4 °C), Min:97.4 °F (36.3 °C), Max:97.7 °F (36.5 °C)    BP range: BP: (135-154)/(71-85) 139/85  Pulse range: Heart Rate:  [] 115  Resp rate range: Resp:  [18-20] 20    Device (Oxygen Therapy): other (see comments)Flow (L/min):  [3-20] 20  Oxygen range:SpO2:  [88 %-97 %] 88 %      75.3 kg (166 lb 1.6 oz); Body mass index is 28.51 kg/m².    Intake/Output Summary (Last 24 hours) at 5/15/2019 1111  Last data filed at 2019 2300  Gross per 24 hour   Intake 1440 ml   Output 1300 ml   Net 140 ml       Physical Exam   Constitutional: She appears well-developed.   HENT:   Head: Normocephalic.   Eyes: Pupils are equal, round, and reactive to light.   Cardiovascular: Normal rate and regular rhythm.   No murmur heard.  Pulmonary/Chest: Effort normal. No respiratory distress. She has decreased breath sounds. She has wheezes (moderate coarse ). She has no rales.   Abdominal: Soft. Bowel sounds are normal. She exhibits no mass. There is no tenderness.   Musculoskeletal: She exhibits no edema.   Skin: No rash noted.     Results Review:    I have reviewed the laboratory and imaging data since the last note by [facility] physician.  My annotations are noted in assessment and plan.    Medication Review:  I have reviewed the current MAR.  My annotations are noted in assessment and plan.       Plan   PCCM Problems  Acute and chronic respiratory failure, hypoxia and hypercapnia  COPD exacerbation  Acute rhinovirus infection  Current cigarette smoker  Pulmonary infiltrates consistent with pulmonary vascular congestion  Relevant Medical Diagnoses  Diabetes mellitus, uncontrolled  Hyperglycemia due to steroids  Acute hyponatremia, resolved    Plan of Treatment  She is expectorating  some.  She is much improved. Still some wheezing. Continue with nebulizer treatments on discharge also including flutter and 7% saline.    SLOW taper pred - entered in dc.    ABG reviewed. Needs to use NIV.    This patient has chronic respiratory failure and requires non-invasive ventilation. Other modalities such as CPAP and BIPAP were considered but ruled out due to severity of lung disease and their ineffectiveness in this condition. Non-invasive-ventilation is required to decrease work of breathing, improve pulmonary status and interruption of respiratory support which could lead to serious harm or death.    Settings on Trilogy:  Mode- AVAPS-AE; Rate - Auto; Goal Tidal Volume 500cc; IPAP max 30; EPAP max 20; EPAP min 4;  PS max 16; PS min 4; FiO2 - O2 bled in at 5L    Management of blood sugars per primary team.    Hyponatremia per primary team.    She must quit smoking and requires a low-dose screening CT as an outpatient.    Needs fu our office in 1 week with APRN. Will arrange.    Alex Olivarez MD  05/15/19  11:11 AM    Part of this note may be an electronic transcription/translation of spoken language to printed text using the Dragon Dictation System.

## 2019-05-15 NOTE — PROGRESS NOTES
"    Patient Name: Emi Voss  :1960  59 y.o.      Patient Care Team:  Provider, No Known as PCP - General    Chief Complaint: dyspnea    Interval History:   She says breathing has improved. Anxious to go home.     Objective   Vital Signs  Temp:  [97.4 °F (36.3 °C)-98.1 °F (36.7 °C)] 98.1 °F (36.7 °C)  Heart Rate:  [] 106  Resp:  [18-20] 20  BP: (135-146)/(71-85) 146/85    Intake/Output Summary (Last 24 hours) at 5/15/2019 1450  Last data filed at 5/15/2019 1200  Gross per 24 hour   Intake 960 ml   Output 850 ml   Net 110 ml     Flowsheet Rows      First Filed Value   Admission Height  162.6 cm (64\") Documented at 2019 0717   Admission Weight  89 kg (196 lb 1.6 oz) Documented at 2019 0717          Physical Exam:   General Appearance:    Alert, cooperative, in no acute distress   Lungs:     Improved air movement, but not good. Wheezing. Rhonchi.      Heart:    Tachy regular rhythm, normal S1 and S2, no murmurs, gallops or rubs.     Chest Wall:    No abnormalities observed   Abdomen:     Soft, nontender, positive bowel sounds.     Extremities:   no cyanosis, clubbing or edema.  No marked joint deformities.  Adequate musculoskeletal strength.       Results Review:    Results from last 7 days   Lab Units 05/15/19  0506   SODIUM mmol/L 137   POTASSIUM mmol/L 3.6   CHLORIDE mmol/L 88*   CO2 mmol/L 40.2*   BUN mg/dL 24*   CREATININE mg/dL 0.54*   GLUCOSE mg/dL 74   CALCIUM mg/dL 9.9     Results from last 7 days   Lab Units 19  0801   TROPONIN T ng/mL <0.010     Results from last 7 days   Lab Units 05/15/19  0506   WBC 10*3/mm3 12.29*   HEMOGLOBIN g/dL 15.9   HEMATOCRIT % 53.4*   PLATELETS 10*3/mm3 293         Results from last 7 days   Lab Units 19  0514   MAGNESIUM mg/dL 2.1                   Medication Review:     enoxaparin 40 mg Subcutaneous Q24H   insulin glargine 35 Units Subcutaneous Nightly   insulin lispro 0-24 Units Subcutaneous 4x Daily With Meals & Nightly "   ipratropium-albuterol 3 mL Nebulization Q4H - RT   metFORMIN 500 mg Oral BID With Meals   predniSONE 40 mg Oral BID With Meals   sodium chloride 3 mL Intravenous Q12H   sodium chloride 4 mL Nebulization Q8H - RT             Assessment/Plan       This is a 59-year-old female who presents with acute hypercapnic and hypoxic respiratory failure.     1.  Grade 1 diastolic dysfunction. No ins/outs recorded. I would not continue to diurese.       2.  Hypercapnic respiratory failure: Breath sounds marginally improved. Using less oxygen today. Related to COPD and viral infection.  Treatment per pulmonology team.     3.  Abnormal EKG:  Poor R wave progression suggesting possible prior anterior MI.  When she is more stable we consider nuclear stress test as an outpatient.  She is currently asymptomatic.    4.  Diabetes: Uncontrolled    Stable for discharge from a cardiac perspective. She will continue to be tachycardic while her lungs recover. Hopefully hypoxia/wheezing will improve as an outpatient. She should see me in 4-6 weeks in follow up.     Thank you for including me in the care of this patient.  Please call with any further questions or concerns.       Suma Mccain MD  [facility]  05/15/19  2:50 PM

## 2019-05-15 NOTE — PROGRESS NOTES
Continued Stay Note  [facility]     Patient Name: Emi Voss  MRN: [id number]  Today's Date: 5/15/2019    Admit Date: 5/12/2019    Discharge Plan     Row Name 05/15/19 1504       Plan    Plan  Plan is home with her dtr.  [facility]errals to [facility] for [facility] base primary care, [facility], and [facility] for home O2 and Trilogy vent.     Plan Comments  Received referral from [facility] that pt will need home O2 and Trilogy vent.  Spoke with pt at bedside.  She is in agreement with [facility].  Referral called to [facility]/ [facility].  Pt states she does not have a a PCP.  CCP discussed home base primary care and pt is in agreement.  She states she is homebound.  Per her consent, referral was called to [facility]/ [facility] ([phone]).  Eval pending.   [facility] discussed possible HH and pt requests [facility] .  Referral called to [facility]/ [facility].  Per pt's request, CCP spoke with pt's dtr, Yue by phone ([phone]) who is in agreement if covered by pt's insurance.         Discharge Codes    No documentation.             Breanna Ramírez, [facility]

## 2019-05-15 NOTE — PROGRESS NOTES
Exercise Oximetry    Patient Name:Emi Voss   MRN: [id number]   Date: 05/15/19             ROOM AIR BASELINE   SpO2% 81   Heart Rate 124   Blood Pressure      EXERCISE ON ROOM AIR SpO2% EXERCISE ON O2 @ 4 LPM SpO2%   1 MINUTE  1 MINUTE 89   2 MINUTES  2 MINUTES 88   3 MINUTES  3 MINUTES 87   4 MINUTES  4 MINUTES 88   5 MINUTES  5 MINUTES 85   6 MINUTES  6 MINUTES 85              Distance Walked  Distance Walked 3 laps (nurses station)   Dyspnea (Nicki Scale)   Dyspnea (Nicki Scale)   Fatigue (Nicki Scale)   Fatigue (Nicki Scale)   SpO2% Post Exercise   SpO2% Post Exercise 89   HR Post Exercise   HR Post Exercise 130   Time to Recovery   Time to Recovery 1     Comments: Pt's resting  SPO2  81% after one min on room air. Pt was placed on 02 @ 3L with exercise   & titrated to 4L to maintain SPO2 85% per titrate order(85 - 90%).  Pt required two  1 min rests during titration but  had no C/O SOA. Pt was returned to room &  placed @ 3L with a SPO2 89% @ rest.   Dr lOivarez @ bedside to talk to pt.    SPO2 was monitored via forehead probe & pt required no assist device for ambulation.

## 2019-05-15 NOTE — DISCHARGE PLACEMENT REQUEST
"Emi Sykes (59 y.o. Female)     Date of Birth Social Security Number Address Home Phone MRN    1960  [id number] [address] [phone] [id number]    [employer] Mar[employer] Status          Unknown        Admission Date Admission Type Admitting Provider Attending Provider Department, Room/Bed    5/12/19 Emergency Suma Mccain MD Nguyen, Minh Loc, MD [address], [address]    Discharge Date Discharge Disposition Discharge Destination                       Attending Provider:  Erich Cohen MD    Allergies:  No Known Allergies    Isolation:  Droplet   Infection:  Rhinovirus  (05/12/19)   Code Status:  CPR    Ht:  162.6 cm (64\")   Wt:  75.3 kg (166 lb 1.6 oz)    Admission Cmt:  None   Principal Problem:  Chronic obstructive pulmonary disease with acute exacerbation (CMS/HCC) [J44.1]                 Active Insurance as of 5/12/2019     Primary Coverage     Payor Plan Insurance Group Employer/Plan Group    MEDICARE MEDICARE A & B      Payor Plan Address Payor Plan Phone Number Payor Plan Fax Number Effective Dates    [address] [phone]  7/1/2007 - None Entered    [place] [address]       Subscriber Name Subscriber Birth Date Member ID       EMI SYKES 1960 [id number]                 Emergency Contacts      (Rel.) Home Phone Work Phone Mobile Phone    radha francis (Daughter) [phone] -- --              "

## 2019-05-15 NOTE — PLAN OF CARE
Problem: Patient Care Overview  Goal: Plan of Care Review  Outcome: Ongoing (interventions implemented as appropriate)   05/15/19 0417   Coping/Psychosocial   Plan of Care Reviewed With patient   Plan of Care Review   Progress improving   OTHER   Outcome Summary VSS. Pt a/o x4. pleasant. hyperglycemia. oxygen sats WNL on 3L. no new complaints. Will CTM     Goal: Discharge Needs Assessment  Outcome: Ongoing (interventions implemented as appropriate)      Problem: Fall Risk (Adult)  Goal: Identify Related Risk Factors and Signs and Symptoms  Outcome: Ongoing (interventions implemented as appropriate)    Goal: Absence of Fall  Outcome: Ongoing (interventions implemented as appropriate)      Problem: Diabetes, Type 2 (Adult)  Goal: Signs and Symptoms of Listed Potential Problems Will be Absent, Minimized or Managed (Diabetes, Type 2)  Outcome: Ongoing (interventions implemented as appropriate)      Problem: Chronic Obstructive Pulmonary Disease (Adult)  Goal: Signs and Symptoms of Listed Potential Problems Will be Absent, Minimized or Managed (Chronic Obstructive Pulmonary Disease)  Outcome: Ongoing (interventions implemented as appropriate)      Problem: Cardiac: Heart Failure (Adult)  Goal: Signs and Symptoms of Listed Potential Problems Will be Absent, Minimized or Managed (Cardiac: Heart Failure)  Outcome: Ongoing (interventions implemented as appropriate)

## 2019-05-15 NOTE — PLAN OF CARE
Problem: Patient Care Overview  Goal: Plan of Care Review  Outcome: Ongoing (interventions implemented as appropriate)   05/15/19 1895   Coping/Psychosocial   Plan of Care Reviewed With patient;daughter   Plan of Care Review   Progress improving   OTHER   Outcome Summary Pt VSS, alert and oriented x4, up ad elicia, had walking oximetry today, she will go home on home 02 and trilogy machine at [facility] which care manager is arranging for. Dr. Olivarez called some medications into [facility] out pharmacy and when they called pt for payment she said she could not pay the 41.80 until she got paid next week. Care manager is looking into hospital picking up tab for her first prescriptions, but I'm not sure what other prescriptions will be given tomorrow. Pt currently on 3L NC w/humidification. No c/o tooth pain today since she recieved tylenol this am.

## 2019-05-15 NOTE — PROGRESS NOTES
[facility]     LOS: 3 days     Name: Emi Voss  Age: 59 y.o.  Sex: female  :  1960  MRN: [id number]         Primary Care Physician: Provider, No Known    Diet Regular; Cardiac, Consistent Carbohydrate    Subjective   She feels fine and wants to go home.  Denies any chest pain or shortness of breath.    Review of Systems   Respiratory: Negative for shortness of breath.    Cardiovascular: Negative for chest pain.     Objective   Temp:  [97.4 °F (36.3 °C)-98.1 °F (36.7 °C)] 98.1 °F (36.7 °C)  Heart Rate:  [] 109  Resp:  [18-20] 20  BP: (135-146)/(71-85) 146/85  SpO2:  [88 %-97 %] 92 %  on  Flow (L/min):  [3] 3;   Device (Oxygen Therapy): humidified;nasal cannula  Body mass index is 28.51 kg/m².    Physical Exam   Constitutional: She is oriented to person, place, and time. No distress.   Cardiovascular: Normal rate and regular rhythm.   Pulmonary/Chest: Effort normal. No respiratory distress. She has decreased breath sounds. She has wheezes.   Abdominal: Soft. Bowel sounds are normal. There is no tenderness. There is no rebound and no guarding.   Musculoskeletal: She exhibits no edema.   Neurological: She is alert and oriented to person, place, and time.   Skin: Skin is warm and dry.   Psychiatric: She has a normal mood and affect. Her behavior is normal.     Reviewed medications and new clinical results    enoxaparin 40 mg Subcutaneous Q24H   insulin glargine 35 Units Subcutaneous Nightly   insulin lispro 0-24 Units Subcutaneous 4x Daily With Meals & Nightly   ipratropium-albuterol 3 mL Nebulization Q4H - RT   metFORMIN 500 mg Oral BID With Meals   predniSONE 40 mg Oral BID With Meals   sodium chloride 3 mL Intravenous Q12H   sodium chloride 4 mL Nebulization Q8H - RT      Results from last 7 days   Lab Units 05/15/19  0506 19  0514 19  0448 19  0801   WBC 10*3/mm3 12.29* 10.94* 10.28 11.51*   HEMOGLOBIN g/dL 15.9 15.3 15.4  16.0*   PLATELETS 10*3/mm3 293 280 258 255     Results from last 7 days   Lab Units 05/15/19  0506 05/14/19  0514 05/13/19  0448 05/12/19  0801   SODIUM mmol/L 137 131* 137 129*   POTASSIUM mmol/L 3.6 3.8 3.8 4.0   CHLORIDE mmol/L 88* 84* 91* 88*   CO2 mmol/L 40.2* 39.3* 32.4* 29.9*   BUN mg/dL 24* 35* 27* 16   CREATININE mg/dL 0.54* 0.63 0.76 0.56*   CALCIUM mg/dL 9.9 9.4 9.3 9.4   GLUCOSE mg/dL 74 240* 406* 385*     Lab Results   Component Value Date    ANIONGAP 8.8 05/15/2019     Glucose   Date/Time Value Ref Range Status   05/15/2019 1034 157 (H) 70 - 130 mg/dL Final   05/15/2019 0816 77 70 - 130 mg/dL Final   05/15/2019 0624 62 (L) 70 - 130 mg/dL Final   05/14/2019 2052 272 (H) 70 - 130 mg/dL Final   05/14/2019 1624 142 (H) 70 - 130 mg/dL Final   05/14/2019 1044 364 (H) 70 - 130 mg/dL Final   05/14/2019 1043 407 (H) 70 - 130 mg/dL Final   05/14/2019 0555 228 (H) 70 - 130 mg/dL Final     Hemoglobin A1C   Date Value Ref Range Status   05/13/2019 12.40 (H) 4.80 - 5.60 % Final     Estimated Creatinine Clearance: 108.6 mL/min (A) (by C-G formula based on SCr of 0.54 mg/dL (L)).    Assessment/Plan   Active Hospital Problems    Diagnosis  POA   • **Chronic obstructive pulmonary disease with acute exacerbation (CMS/HCC) [J44.1]  Yes   • Acute congestive heart failure (CMS/HCC) [I50.9]  Yes   • Hyponatremia [E87.1]  Yes   • Acute respiratory failure with hypoxia and hypercapnia (CMS/HCC) [J96.01, J96.02]  Yes   • Type 2 diabetes mellitus with hyperglycemia (CMS/HCC) [E11.65]  Yes   • Rhinovirus infection [B34.8]  Yes   • Tachycardia [R00.0]  Yes   • Noncompliance [Z91.19]  Not Applicable      Resolved Hospital Problems   No resolved problems to display.     59 y.o. female     · Acute on chronic respiratory failure, rhinovirus infection, Acute exacerbation of COPD: P.o. steroids today and slow wean.  Nebulizer treatments, flutter, 7% saline.  Trilogy.  Follow-up pulmonology APRN 1 week.  · Rhinovirus infection  · Acute  diastolic heart failure  · Diabetes mellitus type 2.  A1c 12.4.  Hypoglycemia this morning will decrease Lantus.  Steroids are being switched to p.o. also.  · Home may be tomorrow.    · discussed with patient and nursing staff.    Erich Cohen MD   05/15/19  4:23 PM

## 2019-05-16 VITALS
DIASTOLIC BLOOD PRESSURE: 68 MMHG | HEIGHT: 64 IN | HEART RATE: 102 BPM | BODY MASS INDEX: 28.53 KG/M2 | WEIGHT: 167.1 LBS | RESPIRATION RATE: 20 BRPM | SYSTOLIC BLOOD PRESSURE: 136 MMHG | OXYGEN SATURATION: 94 % | TEMPERATURE: 97.3 F

## 2019-05-16 LAB
GLUCOSE BLDC GLUCOMTR-MCNC: 124 MG/DL (ref 70–130)
GLUCOSE BLDC GLUCOMTR-MCNC: 190 MG/DL (ref 70–130)

## 2019-05-16 PROCEDURE — 94799 UNLISTED PULMONARY SVC/PX: CPT

## 2019-05-16 PROCEDURE — 82962 GLUCOSE BLOOD TEST: CPT

## 2019-05-16 PROCEDURE — 63710000001 PREDNISONE PER 1 MG: Performed by: INTERNAL MEDICINE

## 2019-05-16 RX ORDER — SENNA AND DOCUSATE SODIUM 50; 8.6 MG/1; MG/1
2 TABLET, FILM COATED ORAL 2 TIMES DAILY PRN
Start: 2019-05-16

## 2019-05-16 RX ORDER — BLOOD-GLUCOSE METER
1 KIT MISCELLANEOUS AS NEEDED
Qty: 1 EACH | Refills: 0 | Status: SHIPPED | OUTPATIENT
Start: 2019-05-16

## 2019-05-16 RX ORDER — INSULIN GLARGINE 100 [IU]/ML
20 INJECTION, SOLUTION SUBCUTANEOUS NIGHTLY
Qty: 10 ML | Refills: 0 | Status: SHIPPED | OUTPATIENT
Start: 2019-05-16 | End: 2019-05-16 | Stop reason: HOSPADM

## 2019-05-16 RX ADMIN — SODIUM CHLORIDE, PRESERVATIVE FREE 3 ML: 5 INJECTION INTRAVENOUS at 09:18

## 2019-05-16 RX ADMIN — IPRATROPIUM BROMIDE AND ALBUTEROL SULFATE 3 ML: 2.5; .5 SOLUTION RESPIRATORY (INHALATION) at 10:24

## 2019-05-16 RX ADMIN — IPRATROPIUM BROMIDE AND ALBUTEROL SULFATE 3 ML: 2.5; .5 SOLUTION RESPIRATORY (INHALATION) at 06:55

## 2019-05-16 RX ADMIN — SODIUM CHLORIDE SOLN NEBU 7% 4 ML: 7 NEBU SOLN at 06:55

## 2019-05-16 RX ADMIN — ACETAMINOPHEN 650 MG: 325 TABLET, FILM COATED ORAL at 15:09

## 2019-05-16 RX ADMIN — IPRATROPIUM BROMIDE AND ALBUTEROL SULFATE 3 ML: 2.5; .5 SOLUTION RESPIRATORY (INHALATION) at 03:24

## 2019-05-16 RX ADMIN — METFORMIN HYDROCHLORIDE 500 MG: 500 TABLET ORAL at 09:18

## 2019-05-16 RX ADMIN — PREDNISONE 40 MG: 20 TABLET ORAL at 09:18

## 2019-05-16 NOTE — PROGRESS NOTES
Case Management Discharge Note    Final Note: Pt to be DC'd home with her dtr, [facility] and [facility].  [facility] has provided new home O2 and new Trilogy vent.     Destination      No service has been selected for the patient.      Durable Medical Equipment - Selection Complete      Service Provider Request Status Selected Services Address Phone Number Fax Number    [facility] Selected Durable Medical Equipment [address], [facility] [address] [phone]      Dialysis/Infusion      No service has been selected for the patient.      Home Medical Care - Selection Complete      Service Provider Request Status Selected Services Address Phone Number Fax Number    [facility] [place] Selected Home Health Services [address] [place] [address] [address] [phone]      Therapy      No service has been selected for the patient.      Community Resources      No service has been selected for the patient.        Transportation Services  Private: Car    Final Discharge Disposition Code: 06 - home with home health care(Reg )

## 2019-05-16 NOTE — PLAN OF CARE
Problem: Patient Care Overview  Goal: Plan of Care Review  Outcome: Ongoing (interventions implemented as appropriate)   05/15/19 2206 05/16/19 0517   Coping/Psychosocial   Plan of Care Reviewed With patient --    Plan of Care Review   Progress --  improving   OTHER   Outcome Summary --  VSS. Pt a/o x4. O2 at 2.5 L. no new complaints overnight. Dc today. Will CTM     Goal: Discharge Needs Assessment  Outcome: Ongoing (interventions implemented as appropriate)      Problem: Fall Risk (Adult)  Goal: Identify Related Risk Factors and Signs and Symptoms  Outcome: Ongoing (interventions implemented as appropriate)    Goal: Absence of Fall  Outcome: Ongoing (interventions implemented as appropriate)      Problem: Diabetes, Type 2 (Adult)  Goal: Signs and Symptoms of Listed Potential Problems Will be Absent, Minimized or Managed (Diabetes, Type 2)  Outcome: Ongoing (interventions implemented as appropriate)      Problem: Chronic Obstructive Pulmonary Disease (Adult)  Goal: Signs and Symptoms of Listed Potential Problems Will be Absent, Minimized or Managed (Chronic Obstructive Pulmonary Disease)  Outcome: Ongoing (interventions implemented as appropriate)      Problem: Cardiac: Heart Failure (Adult)  Goal: Signs and Symptoms of Listed Potential Problems Will be Absent, Minimized or Managed (Cardiac: Heart Failure)  Outcome: Ongoing (interventions implemented as appropriate)

## 2019-05-16 NOTE — DISCHARGE SUMMARY
[facility]    Date of Discharge:  5/16/2019    PCP: Provider, No Known    Discharge Diagnosis:   Active Hospital Problems    Diagnosis  POA   • **Chronic obstructive pulmonary disease with acute exacerbation (CMS/HCC) [J44.1]  Yes   • Acute congestive heart failure (CMS/HCC) [I50.9]  Yes   • Hyponatremia [E87.1]  Yes   • Acute respiratory failure with hypoxia and hypercapnia (CMS/HCC) [J96.01, J96.02]  Yes   • Type 2 diabetes mellitus with hyperglycemia (CMS/[facility]) [E11.65]  Yes   • Rhinovirus infection [B34.8]  Yes   • Tachycardia [R00.0]  Yes   • Noncompliance [Z91.19]  Not Applicable      Resolved Hospital Problems   No resolved problems to display.      Consults     Date and Time Order Name Status Description    5/12/2019 1433 Inpatient Pulmonology Consult Completed     5/12/2019 1251 Inpatient Cardiology Consult Completed     5/12/2019 0904 [facility] (on-call MD unless specified) Completed         Hospital Course  Please see history and physical for details. Patient is a 59 y.o. female admitted with acute hypoxic and hypercapnic respiratory failure and exacerbation of COPD as well as infection by rhinovirus.  She responded to steroids and BiPAP.  Pulmonology recommended a slow steroid taper at discharge and they prescribed in addition to nebulizer breathing treatments and 7% saline..  She needs to stop smoking.  She has chronic respiratory failure and requires non-invasive ventilation and they set up for her to use trilogy device due to the severity of her lung disease.  Settings: Mode- AVAPS-AE; Rate - Auto; Goal Tidal Volume 500cc; IPAP max 30; EPAP max 20; EPAP min 4;  PS max 16; PS min 4; FiO2 - O2 bled in at 5L.    Patient also requires low-dose screening CT of the chest given her smoking history.    Cardiology saw her to and she had an abnormal EKG suggesting possible prior anterior MI.  When she is more stable they will consider stress test as an  outpatient.  They will follow-up with her in 4 to 6 weeks.    She is uncontrolled diabetes.  Her A1c was 12.4.  She was started on Lantus as well as metformin and will need to monitor closely while on a tapering steroid dose.    She does not have a primary care physician and she will be provided with [facility] referral number.    I discussed the patient's findings and my recommendations with patient and nursing staff.    Condition on Discharge: Stable.  Patient would like to go home today.     Temp:  [97.3 °F (36.3 °C)-98.6 °F (37 °C)] 97.3 °F (36.3 °C)  Heart Rate:  [] 102  Resp:  [18-20] 20  BP: (136-146)/(68-85) 136/68  Body mass index is 28.68 kg/m².    Physical Exam   Constitutional: She is oriented to person, place, and time. No distress.   Cardiovascular: Normal rate and regular rhythm.   Pulmonary/Chest: Effort normal. No respiratory distress. She has decreased breath sounds. She has wheezes (mild).   Abdominal: Soft. Bowel sounds are normal. There is no tenderness. There is no rebound and no guarding.   Musculoskeletal: She exhibits no edema.   Neurological: She is alert and oriented to person, place, and time.   Skin: Skin is warm and dry.   Psychiatric: She has a normal mood and affect. Her behavior is normal.        Discharge Medications      New Medications      Instructions Start Date   albuterol (2.5 MG/3ML) 0.083% nebulizer solution  Commonly known as:  PROVENTIL   2.5 mg, Nebulization, Every 4 Hours PRN      glucose blood test strip   Use as instructed      glucose monitor monitoring kit   1 each, Does not apply, As Needed      insulin detemir 100 UNIT/ML injection  Commonly known as:  LEVEMIR   20 Units, Subcutaneous, Daily      metFORMIN 500 MG tablet  Commonly known as:  GLUCOPHAGE   500 mg, Oral, 2 Times Daily With Meals      predniSONE 10 MG tablet  Commonly known as:  DELTASONE   4 tabs bid x 3 days then 4 tabs qd x 3 days then 3 tabs qd x 3 days then 2 tabs qd x 3 days  then 1 tab qd x 3 days then stop; total 54 tabs      sennosides-docusate sodium 8.6-50 MG tablet  Commonly known as:  SENOKOT-S   2 tablets, Oral, 2 Times Daily PRN, get over the counter      sodium chloride 7 % nebulizer solution nebulizer solution  Commonly known as:  HYPERSAL   4 mL, Nebulization, 2 Times Daily            Diet Instructions     Diet: Regular, Consistent Carbohydrate, Cardiac      Discharge Diet:   Regular  Consistent Carbohydrate  Cardiac            Activity Instructions     Activity as Tolerated           Additional Instructions for the Follow-ups that You Need to Schedule     Call MD for problems / concerns.   As directed        Follow-up Information     Mirella Vizcarra APRN Follow up in 1 week(s).    Specialties:  Pulmonary Disease, Sleep Medicine  Why:  transitional care visit  Contact information:  [address]  [place] [address]  [phone]             [facility] Follow up.    Contact information:  [place] [address]  [phone]           Suma Mccain MD Follow up in 4 week(s).    Specialty:  Cardiology  Contact information:  [address]  [address]  [place] [address]  [phone]                 Test Results Pending at Discharge   Order Current Status    Blood Culture - Blood, Arm, Left Preliminary result    Blood Culture - Blood, Arm, Left Preliminary result         Erich Cohen MD  05/16/19  12:38 PM    Discharge time spent greater than 30 minutes.

## 2019-05-16 NOTE — PROGRESS NOTES
Continued Stay Note  [facility]     Patient Name: Emi Voss  MRN: [id number]  Today's Date: 5/16/2019    Admit Date: 5/12/2019    Discharge Plan     Row Name 05/16/19 1439       Plan    Plan Comments  DC orders noted. Sammi/ [facility] ([id number]) notified.  DC summary faxed per her request (fax: [phone]).  Jag/ [facility] notified.  He has received orders from [facility].  Gerda/ [facility] delivered portable O2, and will have Trilogy vent and O2 concentrator delivered when pt gets home.  Pt is enrolled in Meds to Bed.  Spoke with [facility] who states pt's cost for the meds is $62.25.  Cost of 50 test strips will be $9.29.  Pt's dtr, Yue notified and she will provide payment when she arrives.  Spoke with Carry/ DM educator who will give pt a [employer] glucometer.     Final Note  Pt to be DC'd home with her dtr, [facility] and [facility] .  [facility] has provided new home O2 and new Trilogy vent.         Discharge Codes    No documentation.       Expected Discharge Date and Time     Expected Discharge Date Expected Discharge Time    May 16, 2019             Breanna Ramírez RN

## 2019-05-17 ENCOUNTER — READMISSION MANAGEMENT (OUTPATIENT)
Dept: CALL CENTER | Facility: HOSPITAL | Age: 59
End: 2019-05-17

## 2019-05-17 LAB
BACTERIA SPEC AEROBE CULT: NORMAL
BACTERIA SPEC AEROBE CULT: NORMAL

## 2019-05-17 NOTE — OUTREACH NOTE
Prep Survey      Responses   Facility patient discharged from?  [facility]   Is patient eligible?  Yes   Discharge diagnosis  COPD   CHF  Noncompliance    Does the patient have one of the following disease processes/diagnoses(primary or secondary)?  CHF   Does the patient have Home health ordered?  Yes   What is the Home health agency?   [facility]     Is there a DME ordered?  Yes   What DME was ordered?  [facility] has provided new home O2 and new Trilogy vent   Prep survey completed?  Yes          Gerda Ordoñez RN

## 2019-05-20 ENCOUNTER — READMISSION MANAGEMENT (OUTPATIENT)
Dept: CALL CENTER | Facility: HOSPITAL | Age: 59
End: 2019-05-20

## 2019-05-30 ENCOUNTER — READMISSION MANAGEMENT (OUTPATIENT)
Dept: CALL CENTER | Facility: HOSPITAL | Age: 59
End: 2019-05-30

## 2019-05-30 NOTE — OUTREACH NOTE
CHF Week 2 Survey      Responses   Facility patient discharged from?  [facility]   Does the patient have one of the following disease processes/diagnoses(primary or secondary)?  CHF   Week 2 attempt successful?  No   Unsuccessful attempts  Attempt 1          Maximiliano Currie RN

## 2019-06-04 ENCOUNTER — READMISSION MANAGEMENT (OUTPATIENT)
Dept: CALL CENTER | Facility: HOSPITAL | Age: 59
End: 2019-06-04

## 2019-06-04 NOTE — OUTREACH NOTE
CHF Week 2 Survey      Responses   Facility patient discharged from?  [place]   Does the patient have one of the following disease processes/diagnoses(primary or secondary)?  CHF   Week 2 attempt successful?  Yes   Call start time  1248   Call end time  1251   Discharge diagnosis  COPD   CHF  Noncompliance    Is patient permission given to speak with other caregiver?  Yes   Person spoke with today (if not patient) and relationship  her daughter/ Yue Fagan reviewed with patient/caregiver?  Yes   Is the patient having any side effects they believe may be caused by any medication additions or changes?  No   Does the patient have all medications ordered at discharge?  Yes   Is the patient taking all medications as directed (includes completed medication regime)?  Yes   Does the patient have a primary care provider?   Yes   Does the patient have an appointment with their PCP within 7 days of discharge?  Yes   Comments regarding PCP  2 visits since home   Has the patient kept scheduled appointments due by today?  Yes   What is the Home health agency?   [facility]    Has home health visited the patient within 72 hours of discharge?  Yes   What DME was ordered?  [facility] has provided new home O2 and [facility] Trilogy vent   Has all DME been delivered?  Yes   Psychosocial issues?  No   Did the patient receive a copy of their discharge instructions?  Yes   Nursing interventions  Reviewed instructions with patient   What is the patient's perception of their health status since discharge?  Improving   Nursing interventions  Nurse provided patient education   Is the patient weighing daily?  Yes   Does the patient have scales?  Yes   Daily weight interventions  Education provided on importance of daily weight   Is the patient able to teach back Heart Failure diet management?  Yes   Is the patient able to teach back Heart Failure Zones?  Yes   Is the patient able to teach back signs and symptoms of worsening condition? (i.e.  weight gain, shortness of air, etc.)  Yes   Is the patient/caregiver able to teach back the hierarchy of who to call/visit for symptoms/problems? PCP, Specialist, Home health nurse, Urgent Care, ED, 911  Yes   Additional teach back comments  Watching sodium, BS and carbs.  Monitoring weight daily as well.   CHF Week 2 call completed?  Yes          Rolanda Denton RN

## 2019-06-11 ENCOUNTER — READMISSION MANAGEMENT (OUTPATIENT)
Dept: CALL CENTER | Facility: HOSPITAL | Age: 59
End: 2019-06-11

## 2019-06-11 NOTE — OUTREACH NOTE
CHF Week 3 Survey      Responses   Facility patient discharged from?  [place]   Does the patient have one of the following disease processes/diagnoses(primary or secondary)?  CHF   Week 3 attempt successful?  Yes   Call start time  1658   Call end time  1702   Discharge diagnosis  COPD   CHF  Noncompliance    Person spoke with today (if not patient) and relationship  Yue-daughter   Meds reviewed with patient/caregiver?  Yes   Is the patient having any side effects they believe may be caused by any medication additions or changes?  No   Does the patient have all medications ordered at discharge?  Yes   Is the patient taking all medications as directed (includes completed medication regime)?  Yes   Has the patient kept scheduled appointments due by today?  Yes   What is the Home health agency?   [facility]    What DME was ordered?  Still using O2   Psychosocial issues?  No   What is the patient's perception of their health status since discharge?  Improving   Nursing interventions  Nurse provided patient education   Is the patient weighing daily?  Yes   Is the patient able to teach back Heart Failure Zones?  Yes   Is the patient/caregiver able to teach back the hierarchy of who to call/visit for symptoms/problems? PCP, Specialist, Home health nurse, Urgent Care, ED, 911  Yes   CHF Week 3 call completed?  Yes          Tigist Alejandre RN

## 2019-06-18 ENCOUNTER — OFFICE VISIT (OUTPATIENT)
Dept: CARDIOLOGY | Facility: CLINIC | Age: 59
End: 2019-06-18

## 2019-06-18 VITALS
SYSTOLIC BLOOD PRESSURE: 132 MMHG | HEIGHT: 64 IN | BODY MASS INDEX: 32.78 KG/M2 | HEART RATE: 95 BPM | DIASTOLIC BLOOD PRESSURE: 74 MMHG | WEIGHT: 192 LBS

## 2019-06-18 DIAGNOSIS — R94.31 ABNORMAL EKG: Primary | ICD-10-CM

## 2019-06-18 PROCEDURE — 93000 ELECTROCARDIOGRAM COMPLETE: CPT | Performed by: INTERNAL MEDICINE

## 2019-06-18 PROCEDURE — 99213 OFFICE O/P EST LOW 20 MIN: CPT | Performed by: INTERNAL MEDICINE

## 2019-06-18 NOTE — PROGRESS NOTES
Subjective:     Encounter Date:06/18/2019      Patient ID: Emi Voss is a 59 y.o. female.    Chief Complaint:  This is a 59-year-old woman with a past medical history of COPD, diabetes.  She presents for follow-up after hospitalization for COPD exacerbation related to a viral infection.  When she was in the hospital her EKG showed poor R wave progression in the anterior leads.  There is concern for prior MI.  Her troponin remained negative.  She also had no chest pain during that time.  We decided to hold off on any stress testing, given her active wheezing and lack of chest pain.  Her symptoms improved with antibiotics and steroids.  She has seen a pulmonologist since discharge and is on a steroid taper.  She has decreased her smoking from 1 pack a day to one third of a pack per day.  She does plan on quitting soon.  She has not been back to her normal activity as she is weak after her hospitalization.  She does not do any formal exercise.  She does have a lot of stress in her life related to sick relatives.  She will be having a granddaughter soon in the upcoming weeks.        The following portions of the patient's history were reviewed and updated as appropriate: allergies, current medications, past family history, past medical history, past social history, past surgical history and problem list.    Past Medical History:   Diagnosis Date   • Acute respiratory failure with hypoxia (CMS/[facility]) 5/12/2019   • COPD (chronic obstructive pulmonary disease) (CMS/[facility])    • Diabetes mellitus (CMS/[facility])    • Hyponatremia 5/12/2019   • Noncompliance 5/12/2019   • Rhinovirus infection 5/12/2019   • Tachycardia 5/12/2019   • Type 2 diabetes mellitus with hyperglycemia (CMS/[facility]) 5/12/2019       History reviewed. No pertinent family history.    Social History     Socioeconomic History   • Marital status:      Spouse name: Not on file   • Number of children: Not on file   • Years of education: Not on file   •  Highest education level: Not on file   Tobacco Use   • Smoking status: Current Every Day Smoker     Packs/day: 1.00     Types: Cigarettes   Substance and Sexual Activity   • Alcohol use: No     Frequency: Never   • Drug use: No   • Sexual activity: Defer       No Known Allergies    Past Surgical History:   Procedure Laterality Date   •  SECTION     • HYSTERECTOMY         Review of Systems   Constitution: Positive for malaise/fatigue.   Eyes: Negative.    Cardiovascular: Positive for dyspnea on exertion and irregular heartbeat. Negative for chest pain and leg swelling.   Respiratory: Positive for cough, shortness of breath, sleep disturbances due to breathing, snoring, sputum production and wheezing.    Endocrine: Negative.    Hematologic/Lymphatic: Negative.    Skin: Negative.    Musculoskeletal: Positive for arthritis.   Gastrointestinal: Negative.    Genitourinary: Negative.    Neurological: Negative.    Psychiatric/Behavioral: The patient is nervous/anxious.          ECG 12 Lead  Date/Time: 2019 6:08 PM  Performed by: Suma Mccain MD  Authorized by: Suma Mccain MD   Comparison: compared with previous ECG from 2019  Rhythm: sinus rhythm  Rate: normal  Q waves: aVL, V1 and V2    QRS axis: right  Other findings: bilateral atrial abnormality    Clinical impression: abnormal EKG               Objective:     Physical Exam  GEN: no distress, alert and oriented  Eyes: normal sclera, normal lids and lashes  HENT: moist mucus membranes,   Lungs active wheezing  Chest: no abnormalities  Neck: no JVD or carotid bruits  CV: RRR, no murmurs, , +2 DP and 2+ carotid pulses b/l  Abdo: soft,  Nontender, nondistended  Extr: no edema  Skin: no rash, warm, dry  Heme/Lymph: no bruising  Psych: organized thought, normal behavior and affect    Outpatient Encounter Medications as of 2019   Medication Sig Dispense Refill   • albuterol (PROVENTIL) (2.5 MG/3ML) 0.083% nebulizer solution Take 2.5 mg by  nebulization Every 4 (Four) Hours As Needed for Wheezing or Shortness of Air for up to 90 days. 360 mL 2   • glucose blood test strip Use as instructed 100 each 12   • glucose monitor monitoring kit 1 each As Needed (diabetes). 1 each 0   • insulin detemir (LEVEMIR) 100 UNIT/ML injection Inject 20 Units under the skin into the appropriate area as directed Daily. 6 mL 0   • Insulin Pen Needle 32G X 4 MM misc Use 1 pen needle to administer insulin daily 100 each 0   • metFORMIN (GLUCOPHAGE) 500 MG tablet Take 1 tablet by mouth 2 (Two) Times a Day With Meals. 60 tablet 0   • predniSONE (DELTASONE) 10 MG tablet 4 tabs bid x 3 days then 4 tabs qd x 3 days then 3 tabs qd x 3 days then 2 tabs qd x 3 days then 1 tab qd x 3 days then stop; total 54 tabs 54 tablet 0   • sennosides-docusate sodium (SENOKOT-S) 8.6-50 MG tablet Take 2 tablets by mouth 2 (Two) Times a Day As Needed for Constipation. get over the counter     • sodium chloride (HYPERSAL) 7 % nebulizer solution nebulizer solution Take 4 mL by nebulization 2 (Two) times a day for 90 days. 240 mL 2   • TRELEGY ELLIPTA 100-62.5-25 MCG/INH aerosol powder  Inhale Daily.  5     No facility-administered encounter medications on file as of 6/18/2019.              Assessment:          Diagnosis Plan   1. Abnormal EKG  Stress Test With Myocardial Perfusion          Plan:       This is a 59-year-old female with severe COPD who had a recent COPD exacerbation.  Her EKG was abnormal while she was hospitalized.    The patient continues to be dyspneic but does not have any chest pain.  I think this is most likely related to her ongoing issues with COPD as she continues to wheeze heavily on exam.  I do think she needs a stress test, however given that she continues to wheeze I would like her lungs to be in better shape before she gets Lexiscan.  I have ordered her nuclear stress test today and she will have this completed in the next few weeks, after she is completed her prednisone  taper for her COPD exacerbation.    We had a long talk about tobacco cessation.  She is has already cut back substantially and does plan on quitting soon.           Suma Mccain MD  06/18/19  [facility]

## 2019-06-19 ENCOUNTER — READMISSION MANAGEMENT (OUTPATIENT)
Dept: CALL CENTER | Facility: HOSPITAL | Age: 59
End: 2019-06-19

## 2019-06-19 NOTE — OUTREACH NOTE
CHF Week 4 Survey      Responses   Facility patient discharged from?  [place]   Does the patient have one of the following disease processes/diagnoses(primary or secondary)?  CHF   Week 4 attempt successful?  No          Evangelina Miller RN

## 2019-07-15 ENCOUNTER — APPOINTMENT (OUTPATIENT)
Dept: CARDIOLOGY | Facility: HOSPITAL | Age: 59
End: 2019-07-15

## 2019-08-02 ENCOUNTER — APPOINTMENT (OUTPATIENT)
Dept: CARDIOLOGY | Facility: HOSPITAL | Age: 59
End: 2019-08-02

## 2020-07-26 NOTE — OUTREACH NOTE
CHF Week 1 Survey      Responses   Facility patient discharged from?  [place]   Does the patient have one of the following disease processes/diagnoses(primary or secondary)?  CHF   Is there a successful TCM telephone encounter documented?  No   CHF Week 1 attempt successful?  Yes   Call start time  0930   Call end time  0941   Discharge diagnosis  COPD   CHF  Noncompliance    Is patient permission given to speak with other caregiver?  Yes   Person spoke with today (if not patient) and relationship  her daughter/ Yue Fagan reviewed with patient/caregiver?  Yes   Is the patient having any side effects they believe may be caused by any medication additions or changes?  No   Does the patient have all medications ordered at discharge?  Yes   Is the patient taking all medications as directed (includes completed medication regime)?  Yes   Does the patient have a primary care provider?   Yes   Does the patient have an appointment with their PCP within 7 days of discharge?  Yes   Comments regarding PCP  Daughter reports she has arranged for at home doctors ([facility]) to come to home.    Has the patient kept scheduled appointments due by today?  Yes   What is the Home health agency?   [facility]     Has home health visited the patient within 72 hours of discharge?  Yes   What DME was ordered?  [facility] has provided new home O2 and new Trilogy vent   Psychosocial issues?  No   Did the patient receive a copy of their discharge instructions?  Yes   Nursing interventions  Reviewed instructions with patient   What is the patient's perception of their health status since discharge?  Improving   Nursing interventions  Nurse provided patient education   Is the patient weighing daily?  Yes   Does the patient have scales?  Yes   Is the patient able to teach back Heart Failure diet management?  Yes   Is the patient able to teach back Heart Failure Zones?  Yes   Is the patient able to teach back signs and  symptoms of worsening condition? (i.e. weight gain, shortness of air, etc.)  Yes   Is the patient/caregiver able to teach back the hierarchy of who to call/visit for symptoms/problems? PCP, Specialist, Home health nurse, Urgent Care, ED, 911  Yes   Additional teach back comments  Daughter reports her mom is keeping log for weights, temps, B/P and glucose daily. Explained to daughter if her mom gains 3 lbs overnight or 5 lbs in a week to call doctor right away. She verbalized understanding.     CHF Week 1 call completed?  Yes          Maximiliano Currie RN   yes no
